# Patient Record
Sex: FEMALE | Race: WHITE | NOT HISPANIC OR LATINO | Employment: OTHER | URBAN - METROPOLITAN AREA
[De-identification: names, ages, dates, MRNs, and addresses within clinical notes are randomized per-mention and may not be internally consistent; named-entity substitution may affect disease eponyms.]

---

## 2017-01-14 ENCOUNTER — TELEPHONE (OUTPATIENT)
Dept: FAMILY MEDICINE | Facility: CLINIC | Age: 82
End: 2017-01-14

## 2017-01-14 ENCOUNTER — LAB VISIT (OUTPATIENT)
Dept: LAB | Facility: HOSPITAL | Age: 82
End: 2017-01-14
Attending: FAMILY MEDICINE
Payer: MEDICARE

## 2017-01-14 DIAGNOSIS — R30.0 DYSURIA: Primary | ICD-10-CM

## 2017-01-14 DIAGNOSIS — N39.0 LOWER URINARY TRACT INFECTIOUS DISEASE: ICD-10-CM

## 2017-01-14 DIAGNOSIS — R30.0 DYSURIA: ICD-10-CM

## 2017-01-14 LAB
BACTERIA #/AREA URNS AUTO: ABNORMAL /HPF
BILIRUB UR QL STRIP: NEGATIVE
CLARITY UR REFRACT.AUTO: ABNORMAL
COLOR UR AUTO: ABNORMAL
GLUCOSE UR QL STRIP: NEGATIVE
HGB UR QL STRIP: ABNORMAL
KETONES UR QL STRIP: NEGATIVE
LEUKOCYTE ESTERASE UR QL STRIP: ABNORMAL
MICROSCOPIC COMMENT: ABNORMAL
NITRITE UR QL STRIP: NEGATIVE
PH UR STRIP: 7 [PH] (ref 5–8)
PROT UR QL STRIP: NEGATIVE
RBC #/AREA URNS AUTO: 1 /HPF (ref 0–4)
SP GR UR STRIP: 1 (ref 1–1.03)
SQUAMOUS #/AREA URNS AUTO: 5 /HPF
URN SPEC COLLECT METH UR: ABNORMAL
UROBILINOGEN UR STRIP-ACNC: NEGATIVE EU/DL
WBC #/AREA URNS AUTO: 47 /HPF (ref 0–5)
WBC CLUMPS UR QL AUTO: ABNORMAL

## 2017-01-14 PROCEDURE — 81001 URINALYSIS AUTO W/SCOPE: CPT

## 2017-01-14 PROCEDURE — 87086 URINE CULTURE/COLONY COUNT: CPT

## 2017-01-14 RX ORDER — PHENAZOPYRIDINE HYDROCHLORIDE 200 MG/1
TABLET, FILM COATED ORAL
Qty: 9 TABLET | Refills: 0 | Status: SHIPPED | OUTPATIENT
Start: 2017-01-14 | End: 2017-09-12 | Stop reason: SDUPTHER

## 2017-01-14 RX ORDER — CIPROFLOXACIN 250 MG/1
250 TABLET, FILM COATED ORAL 2 TIMES DAILY
Qty: 10 TABLET | Refills: 0 | Status: SHIPPED | OUTPATIENT
Start: 2017-01-14 | End: 2017-01-19

## 2017-01-14 NOTE — TELEPHONE ENCOUNTER
Here are orders for UA and culture for her to have this am.  I also sent in an antibiotic to start while awaiting the culture results, thanks

## 2017-01-14 NOTE — TELEPHONE ENCOUNTER
----- Message from Vani Sanchez sent at 1/14/2017  9:15 AM CST -----  Contact: [phone]/ self   Patient requesting orders for urine culture.    Patient believes she may have a UTI.    Please advise.

## 2017-01-16 ENCOUNTER — TELEPHONE (OUTPATIENT)
Dept: FAMILY MEDICINE | Facility: CLINIC | Age: 82
End: 2017-01-16

## 2017-01-16 DIAGNOSIS — I10 ESSENTIAL HYPERTENSION: ICD-10-CM

## 2017-01-16 LAB — BACTERIA UR CULT: NO GROWTH

## 2017-01-16 RX ORDER — AMLODIPINE BESYLATE 10 MG/1
TABLET ORAL
Qty: 90 TABLET | Refills: 0 | Status: SHIPPED | OUTPATIENT
Start: 2017-01-16 | End: 2017-03-31 | Stop reason: SDUPTHER

## 2017-01-17 ENCOUNTER — TELEPHONE (OUTPATIENT)
Dept: FAMILY MEDICINE | Facility: CLINIC | Age: 82
End: 2017-01-17

## 2017-01-17 NOTE — TELEPHONE ENCOUNTER
Returned patients call. She stated she went to  Saturday to [place]. She thought she had a UTI. She received a call today sating it was not a UTI. She is still having symptoms and was advised to follow up with her PCP. She is now scheduled

## 2017-01-17 NOTE — TELEPHONE ENCOUNTER
----- Message from Giana Agrawal sent at 1/17/2017 11:23 AM CST -----  Contact: [phone]/ self   Patient is having frequent urination with discomfort and burning. Urinalysis didn't show anything and she was advised to follow up with PCP. Patient would like to be seen sooner than the next available appointment. Please advise.

## 2017-01-18 DIAGNOSIS — I10 ESSENTIAL HYPERTENSION: ICD-10-CM

## 2017-01-18 RX ORDER — AMLODIPINE BESYLATE 10 MG/1
TABLET ORAL
Qty: 90 TABLET | Refills: 3 | Status: SHIPPED | OUTPATIENT
Start: 2017-01-18 | End: 2018-04-16 | Stop reason: SDUPTHER

## 2017-01-30 ENCOUNTER — TELEPHONE (OUTPATIENT)
Dept: FAMILY MEDICINE | Facility: CLINIC | Age: 82
End: 2017-01-30

## 2017-01-30 RX ORDER — AZITHROMYCIN 250 MG/1
TABLET, FILM COATED ORAL
Qty: 6 TABLET | Refills: 0 | Status: SHIPPED | OUTPATIENT
Start: 2017-01-30 | End: 2017-03-31

## 2017-01-30 NOTE — TELEPHONE ENCOUNTER
Returned patient's call, no answer and unable to leave a voice mail due to phone ringing continuously.

## 2017-01-30 NOTE — TELEPHONE ENCOUNTER
----- Message from Argentina Evans sent at 1/30/2017  7:44 AM CST -----  Contact: self/[phone]  Patient would like to have a kallie-william called in for a sore throat today.  Please advise

## 2017-02-21 ENCOUNTER — TELEPHONE (OUTPATIENT)
Dept: FAMILY MEDICINE | Facility: CLINIC | Age: 82
End: 2017-02-21

## 2017-02-21 NOTE — TELEPHONE ENCOUNTER
----- Message from Kiarra Agudelo sent at 2/21/2017  3:39 PM CST -----  Contact: self, [phone]  Patient requests to be seen tomorrow if possible, states she is having blood pressure issues. Please advise.

## 2017-03-15 ENCOUNTER — TELEPHONE (OUTPATIENT)
Dept: PODIATRY | Facility: CLINIC | Age: 82
End: 2017-03-15

## 2017-03-15 DIAGNOSIS — I10 ESSENTIAL HYPERTENSION: ICD-10-CM

## 2017-03-15 RX ORDER — BENAZEPRIL HYDROCHLORIDE 40 MG/1
TABLET ORAL
Qty: 90 TABLET | Refills: 0 | Status: SHIPPED | OUTPATIENT
Start: 2017-03-15 | End: 2017-03-31 | Stop reason: SDUPTHER

## 2017-03-15 NOTE — TELEPHONE ENCOUNTER
----- Message from Nadia Urias MA sent at 3/15/2017 10:41 AM CDT -----  Contact: [phone] SELF  Waiting for a phone call to re schedule her appt that she miss today. Please advsie

## 2017-03-15 NOTE — TELEPHONE ENCOUNTER
----- Message from Nadia Urias MA sent at 3/15/2017 10:34 AM CDT -----  Contact: [phone] self  REFILLS:     Patient is requesting a medication refill.     RX name: benazepril (LOTENSIN)     Strength: 40mg tab       [address]     Pharmacy phone: [phone]    Contact phone number: [phone]

## 2017-03-22 DIAGNOSIS — F51.04 CHRONIC INSOMNIA: ICD-10-CM

## 2017-03-22 RX ORDER — ZOLPIDEM TARTRATE 10 MG/1
TABLET ORAL
Qty: 90 TABLET | Refills: 0 | Status: SHIPPED | OUTPATIENT
Start: 2017-03-22 | End: 2017-03-22 | Stop reason: SDUPTHER

## 2017-03-22 RX ORDER — ZOLPIDEM TARTRATE 10 MG/1
TABLET ORAL
Qty: 90 TABLET | Refills: 0 | Status: SHIPPED | OUTPATIENT
Start: 2017-03-22 | End: 2017-07-09 | Stop reason: SDUPTHER

## 2017-03-22 NOTE — TELEPHONE ENCOUNTER
----- Message from Teena Aldridge sent at 3/22/2017 12:39 PM CDT -----  Contact: [phone]/ self   Pt called stating pharmacy did not get rx  zolpidem (AMBIEN) 10 mg Tab. Please advise

## 2017-03-27 PROBLEM — R73.02 GLUCOSE INTOLERANCE (IMPAIRED GLUCOSE TOLERANCE): Status: ACTIVE | Noted: 2017-03-27

## 2017-03-28 ENCOUNTER — LAB VISIT (OUTPATIENT)
Dept: LAB | Facility: HOSPITAL | Age: 82
End: 2017-03-28
Attending: FAMILY MEDICINE
Payer: MEDICARE

## 2017-03-28 DIAGNOSIS — Z00.00 ROUTINE GENERAL MEDICAL EXAMINATION AT A HEALTH CARE FACILITY: ICD-10-CM

## 2017-03-28 DIAGNOSIS — F41.9 ANXIETY: ICD-10-CM

## 2017-03-28 DIAGNOSIS — I10 ESSENTIAL HYPERTENSION: ICD-10-CM

## 2017-03-28 DIAGNOSIS — R73.02 GLUCOSE INTOLERANCE (IMPAIRED GLUCOSE TOLERANCE): ICD-10-CM

## 2017-03-28 DIAGNOSIS — E03.4 HYPOTHYROIDISM DUE TO ACQUIRED ATROPHY OF THYROID: ICD-10-CM

## 2017-03-28 LAB
ALBUMIN SERPL BCP-MCNC: 3.7 G/DL
ALP SERPL-CCNC: 57 U/L
ALT SERPL W/O P-5'-P-CCNC: 14 U/L
ANION GAP SERPL CALC-SCNC: 8 MMOL/L
AST SERPL-CCNC: 17 U/L
BASOPHILS # BLD AUTO: 0.06 K/UL
BASOPHILS NFR BLD: 1 %
BILIRUB SERPL-MCNC: 0.6 MG/DL
BUN SERPL-MCNC: 10 MG/DL
CALCIUM SERPL-MCNC: 9.1 MG/DL
CHLORIDE SERPL-SCNC: 107 MMOL/L
CHOLEST/HDLC SERPL: 3.2 {RATIO}
CO2 SERPL-SCNC: 27 MMOL/L
CREAT SERPL-MCNC: 0.8 MG/DL
DIFFERENTIAL METHOD: NORMAL
EOSINOPHIL # BLD AUTO: 0.5 K/UL
EOSINOPHIL NFR BLD: 7.4 %
ERYTHROCYTE [DISTWIDTH] IN BLOOD BY AUTOMATED COUNT: 12.8 %
EST. GFR  (AFRICAN AMERICAN): >60 ML/MIN/1.73 M^2
EST. GFR  (NON AFRICAN AMERICAN): >60 ML/MIN/1.73 M^2
GLUCOSE SERPL-MCNC: 113 MG/DL
HCT VFR BLD AUTO: 43.3 %
HDL/CHOLESTEROL RATIO: 31.7 %
HDLC SERPL-MCNC: 224 MG/DL
HDLC SERPL-MCNC: 71 MG/DL
HGB BLD-MCNC: 14.4 G/DL
LDLC SERPL CALC-MCNC: 133.6 MG/DL
LYMPHOCYTES # BLD AUTO: 2.4 K/UL
LYMPHOCYTES NFR BLD: 38.5 %
MCH RBC QN AUTO: 30.9 PG
MCHC RBC AUTO-ENTMCNC: 33.3 %
MCV RBC AUTO: 93 FL
MONOCYTES # BLD AUTO: 0.6 K/UL
MONOCYTES NFR BLD: 10.1 %
NEUTROPHILS # BLD AUTO: 2.6 K/UL
NEUTROPHILS NFR BLD: 42.8 %
NONHDLC SERPL-MCNC: 153 MG/DL
PLATELET # BLD AUTO: 277 K/UL
PMV BLD AUTO: 10.2 FL
POTASSIUM SERPL-SCNC: 4.1 MMOL/L
PROT SERPL-MCNC: 7.2 G/DL
RBC # BLD AUTO: 4.66 M/UL
SODIUM SERPL-SCNC: 142 MMOL/L
THYROPEROXIDASE IGG SERPL-ACNC: 212.6 IU/ML
TRIGL SERPL-MCNC: 97 MG/DL
TSH SERPL DL<=0.005 MIU/L-ACNC: 1.97 UIU/ML
WBC # BLD AUTO: 6.11 K/UL

## 2017-03-28 PROCEDURE — 84443 ASSAY THYROID STIM HORMONE: CPT

## 2017-03-28 PROCEDURE — 86376 MICROSOMAL ANTIBODY EACH: CPT

## 2017-03-28 PROCEDURE — 83036 HEMOGLOBIN GLYCOSYLATED A1C: CPT

## 2017-03-28 PROCEDURE — 36415 COLL VENOUS BLD VENIPUNCTURE: CPT | Mod: PO

## 2017-03-28 PROCEDURE — 80053 COMPREHEN METABOLIC PANEL: CPT

## 2017-03-28 PROCEDURE — 85025 COMPLETE CBC W/AUTO DIFF WBC: CPT

## 2017-03-28 PROCEDURE — 80061 LIPID PANEL: CPT

## 2017-03-28 RX ORDER — LORAZEPAM 1 MG/1
TABLET ORAL
Qty: 90 TABLET | Refills: 0 | Status: SHIPPED | OUTPATIENT
Start: 2017-03-28 | End: 2017-05-25 | Stop reason: SDUPTHER

## 2017-03-29 LAB
ESTIMATED AVG GLUCOSE: 128 MG/DL
HBA1C MFR BLD HPLC: 6.1 %

## 2017-03-31 ENCOUNTER — OFFICE VISIT (OUTPATIENT)
Dept: FAMILY MEDICINE | Facility: CLINIC | Age: 82
End: 2017-03-31
Payer: MEDICARE

## 2017-03-31 VITALS
HEART RATE: 65 BPM | HEIGHT: 66 IN | DIASTOLIC BLOOD PRESSURE: 72 MMHG | OXYGEN SATURATION: 95 % | WEIGHT: 139.31 LBS | SYSTOLIC BLOOD PRESSURE: 128 MMHG | BODY MASS INDEX: 22.39 KG/M2

## 2017-03-31 DIAGNOSIS — E03.4 HYPOTHYROIDISM DUE TO ACQUIRED ATROPHY OF THYROID: ICD-10-CM

## 2017-03-31 DIAGNOSIS — R73.02 GLUCOSE INTOLERANCE (IMPAIRED GLUCOSE TOLERANCE): ICD-10-CM

## 2017-03-31 DIAGNOSIS — F41.9 ANXIETY: ICD-10-CM

## 2017-03-31 DIAGNOSIS — Z00.00 ROUTINE GENERAL MEDICAL EXAMINATION AT A HEALTH CARE FACILITY: Primary | ICD-10-CM

## 2017-03-31 DIAGNOSIS — C50.912 MALIGNANT NEOPLASM OF LEFT FEMALE BREAST, UNSPECIFIED SITE OF BREAST: ICD-10-CM

## 2017-03-31 DIAGNOSIS — I10 ESSENTIAL HYPERTENSION: ICD-10-CM

## 2017-03-31 PROCEDURE — 99213 OFFICE O/P EST LOW 20 MIN: CPT | Mod: PBBFAC,PO | Performed by: FAMILY MEDICINE

## 2017-03-31 PROCEDURE — 99999 PR PBB SHADOW E&M-EST. PATIENT-LVL III: CPT | Mod: PBBFAC,,, | Performed by: FAMILY MEDICINE

## 2017-03-31 PROCEDURE — 99397 PER PM REEVAL EST PAT 65+ YR: CPT | Mod: S$PBB,,, | Performed by: FAMILY MEDICINE

## 2017-03-31 RX ORDER — AZITHROMYCIN 250 MG/1
TABLET, FILM COATED ORAL
Qty: 6 TABLET | Refills: 0 | Status: SHIPPED | OUTPATIENT
Start: 2017-03-31 | End: 2017-06-02

## 2017-03-31 RX ORDER — BENAZEPRIL HYDROCHLORIDE 40 MG/1
40 TABLET ORAL DAILY
Qty: 90 TABLET | Refills: 3 | Status: SHIPPED | OUTPATIENT
Start: 2017-03-31 | End: 2018-05-29 | Stop reason: SDUPTHER

## 2017-03-31 NOTE — PROGRESS NOTES
Subjective:       Patient ID: Isabelle Roberts is a 83 y.o. female.    Chief Complaint: Follow-up    HPI Comments: 83 yr old pleasant white female with anxiety, insomnia, HTN, hypothyroidism, breast CA in remission, glaucoma, presents today for her annual wellness exam, lab work review. She also wants med refill.    HTN - controlled- on norvasc and lisinopril daily - compliant - no side effects      Hypothyroidism - controlled - normal TSh - on synthroid - no side effects      Anxiety/insomnia - chronic and stable - takes ativan once a day and ambien at night and no side effects      Breast CA in remission - follows oncology      History as below - reviewed      Health maintenance  -labs UTD  -mammo UTD  -vaccines UTD        Medication Refill   This is a chronic problem. The current episode started more than 1 year ago. The problem occurs constantly. The problem has been gradually improving. Pertinent negatives include no arthralgias, chest pain, congestion, coughing, diaphoresis, headaches, joint swelling, myalgias, nausea, neck pain, rash, sore throat, visual change, vomiting or weakness. Nothing aggravates the symptoms. Treatments tried: as below. The treatment provided significant relief.   Hypertension   This is a chronic problem. The current episode started more than 1 year ago. The problem has been gradually improving since onset. The problem is controlled. Associated symptoms include anxiety. Pertinent negatives include no chest pain, headaches, malaise/fatigue, neck pain, palpitations, PND, shortness of breath or sweats. There are no associated agents to hypertension. Risk factors for coronary artery disease include post-menopausal state. Past treatments include calcium channel blockers and ACE inhibitors. The current treatment provides significant improvement. There are no compliance problems.  Hypertensive end-organ damage includes a thyroid problem. There is no history of angina, CAD/MI, CVA, left  ventricular hypertrophy, PVD or retinopathy. There is no history of chronic renal disease, coarctation of the aorta, a hypertension causing med or sleep apnea.   Thyroid Problem   Presents for follow-up visit. Symptoms include anxiety. Patient reports no cold intolerance, constipation, diaphoresis, diarrhea, heat intolerance, menstrual problem, palpitations, tremors, visual change or weight loss. The symptoms have been stable. Past treatments include levothyroxine. The treatment provided significant relief. Prior procedures include thyroid ultrasound. There is no history of atrial fibrillation, diabetes, Graves' ophthalmopathy, hyperlipidemia, neuropathy or osteopenia. There are no known risk factors.   Anxiety   Presents for follow-up visit. Symptoms include excessive worry and nervous/anxious behavior. Patient reports no chest pain, confusion, decreased concentration, dizziness, nausea, palpitations, shortness of breath or suicidal ideas. Symptoms occur most days. Nothing aggravates the symptoms. The quality of sleep is fair. Nighttime awakenings: occasional.     There are no known risk factors. Her past medical history is significant for anxiety/panic attacks. There is no history of asthma, bipolar disorder, CAD, chronic lung disease, depression, fibromyalgia or suicide attempts. Past treatments include benzodiazephines. The treatment provided significant relief. Compliance with prior treatments has been good. Compliance with medications is %.     Review of Systems   Constitutional: Negative.  Negative for activity change, diaphoresis, malaise/fatigue, unexpected weight change and weight loss.   HENT: Negative.  Negative for congestion, ear discharge, hearing loss, rhinorrhea, sore throat and voice change.    Eyes: Negative.  Negative for pain, discharge and visual disturbance.   Respiratory: Negative.  Negative for cough, chest tightness, shortness of breath and wheezing.    Cardiovascular: Negative.   Negative for chest pain, palpitations and PND.   Gastrointestinal: Negative.  Negative for abdominal distention, anal bleeding, constipation, diarrhea, nausea and vomiting.   Endocrine: Negative.  Negative for cold intolerance, heat intolerance, polydipsia and polyuria.   Genitourinary: Negative.  Negative for decreased urine volume, difficulty urinating, dysuria, frequency, menstrual problem and vaginal pain.   Musculoskeletal: Negative.  Negative for arthralgias, gait problem, joint swelling, myalgias and neck pain.   Skin: Negative.  Negative for color change, pallor, rash and wound.   Allergic/Immunologic: Negative.  Negative for environmental allergies and immunocompromised state.   Neurological: Negative.  Negative for dizziness, tremors, seizures, speech difficulty, weakness and headaches.   Hematological: Negative.  Negative for adenopathy. Does not bruise/bleed easily.   Psychiatric/Behavioral: Negative for agitation, confusion, decreased concentration, hallucinations, self-injury and suicidal ideas. The patient is nervous/anxious.        PMH/PSH/FH/SH/MED/ALLERGY reviewed    Objective:       Vitals:    03/31/17 1021   BP: 128/72   Pulse: 65       Physical Exam   Constitutional: She is oriented to person, place, and time. She appears well-developed and well-nourished. No distress.   HENT:   Head: Normocephalic and atraumatic.   Right Ear: External ear normal.   Left Ear: External ear normal.   Nose: Nose normal.   Mouth/Throat: Oropharynx is clear and moist. No oropharyngeal exudate.   Eyes: Conjunctivae and EOM are normal. Pupils are equal, round, and reactive to light. Right eye exhibits no discharge. Left eye exhibits no discharge. No scleral icterus.   Neck: Normal range of motion. Neck supple. No JVD present. No tracheal deviation present. No thyromegaly present.   Cardiovascular: Normal rate, regular rhythm, normal heart sounds and intact distal pulses.  Exam reveals no gallop and no friction rub.     No murmur heard.  Pulmonary/Chest: Effort normal and breath sounds normal. No stridor. She has no wheezes. She has no rales. She exhibits no tenderness.   Abdominal: Soft. Bowel sounds are normal. She exhibits no distension and no mass. There is no tenderness. There is no rebound and no guarding. No hernia.   Musculoskeletal: Normal range of motion. She exhibits no edema or tenderness.   Lymphadenopathy:     She has no cervical adenopathy.   Neurological: She is alert and oriented to person, place, and time. She has normal reflexes. She displays normal reflexes. No cranial nerve deficit. She exhibits normal muscle tone. Coordination normal.   Skin: Skin is warm and dry. No rash noted. She is not diaphoretic. No erythema. No pallor.   Psychiatric: She has a normal mood and affect. Her behavior is normal. Judgment and thought content normal.       Assessment:       1. Routine general medical examination at a health care facility    2. Malignant neoplasm of left female breast, unspecified site of breast    3. Anxiety    4. Essential hypertension    5. Hypothyroidism due to acquired atrophy of thyroid    6. Glucose intolerance (impaired glucose tolerance)        Plan:       Isabelle was seen today for follow-up.    Diagnoses and all orders for this visit:    Routine general medical examination at a health care facility    Malignant neoplasm of left female breast, unspecified site of breast    Anxiety    Essential hypertension  -     benazepril (LOTENSIN) 40 MG tablet; Take 1 tablet (40 mg total) by mouth once daily.    Hypothyroidism due to acquired atrophy of thyroid    Glucose intolerance (impaired glucose tolerance)    Other orders  -     azithromycin (Z-GERRY) 250 MG tablet; TAKE TWO TABLETS BY MOUTH ON DAY 1, AND THEN TAKE ONE TABLET BY MOUTH ONCE A DAY FOR DAYS 2-5      Wellness check  -normal exam  -labs reassuring      HTN  -controlled  -refilled meds  -labs    Hypothyroidism  -controlled      Chronic  insomnia  Ambien 10 prn  -sleep hygiene    Left breast CA  -in remission  -follows oncology    Anxiety  -controlled  -continue REMERON  -ativan prn. Side effects of medications have been discussed and patient agreed to proceed with treatment and understands the risks and benefits.    Spent adequate time in obtaining history and explaining differentials    40 minutes spent during this visit of which greater than 50% devoted to face-face counseling and coordination of care regarding diagnosis and management plan    Return in about 1 year (around 3/31/2018), or if symptoms worsen or fail to improve.

## 2017-03-31 NOTE — MR AVS SNAPSHOT
[address]  [place] [address]  Phone: [phone]  Fax: [phone]                  Isabelle Roberts   3/31/2017 10:00 AM   Office Visit    Description:  Female : 12/10/1933   Provider:  Devin Bernal MD   Department:  [facility]           Reason for Visit     Follow-up           Diagnoses this Visit        Comments    Routine general medical examination at a health care facility    -  Primary     Malignant neoplasm of left female breast, unspecified site of breast         Anxiety         Essential hypertension         Hypothyroidism due to acquired atrophy of thyroid         Glucose intolerance (impaired glucose tolerance)                To Do List           Goals (5 Years of Data)     None      Follow-Up and Disposition     Return in about 1 year (around 3/31/2018), or if symptoms worsen or fail to improve.       These Medications        Disp Refills Start End    benazepril (LOTENSIN) 40 MG tablet 90 tablet 3 3/31/2017     Take 1 tablet (40 mg total) by mouth once daily. - Oral    Pharmacy: [address] ([facility] & [address] Ph #: [phone]       azithromycin (Z-GERRY) 250 MG tablet 6 tablet 0 3/31/2017     TAKE TWO TABLETS BY MOUTH ON DAY 1, AND THEN TAKE ONE TABLET BY MOUTH ONCE A DAY FOR DAYS 2-5    [facility] [address] [facility] ([facility] & [place], [place] - [address] [facility] Ph #: [phone]         [facility] On [facility]     [facility] Nurse Care Line -  Assistance  Unless otherwise directed by your provider, please contact [facility], our nurse care line that is available for / assistance.     Registered nurses in the [facility] provide: appointment scheduling, clinical advisement, health education, and other advisory services.  Call: [phone] (toll free)               Medications           Message regarding Medications     Verify the changes  and/or additions to your medication regime listed below are the same as discussed with your clinician today.  If any of these changes or additions are incorrect, please notify your healthcare provider.        CHANGE how you are taking these medications     Start Taking Instead of    benazepril (LOTENSIN) 40 MG tablet benazepril (LOTENSIN) 40 MG tablet    Dosage:  Take 1 tablet (40 mg total) by mouth once daily. Dosage:  TAKE ONE TABLET BY MOUTH ONCE DAILY    Reason for Change:  Reorder       STOP taking these medications     erythromycin (ROMYCIN) ophthalmic ointment Place into the right eye as needed.            Verify that the below list of medications is an accurate representation of the medications you are currently taking.  If none reported, the list may be blank. If incorrect, please contact your healthcare provider. Carry this list with you in case of emergency.           Current Medications     amlodipine (NORVASC) 10 MG tablet TAKE ONE TABLET (10 MG TOTAL) BY MOUTH ONCE DAILY    benazepril (LOTENSIN) 40 MG tablet Take 1 tablet (40 mg total) by mouth once daily.    conjugated estrogens (PREMARIN) vaginal cream Place vaginally once daily.    dorzolamide-timolol 2-0.5% (COSOPT) 22.3-6.8 mg/mL ophthalmic solution Place 1 drop into both eyes 2 (two) times daily.     lorazepam (ATIVAN) 1 MG tablet TAKE ONE TABLET BY MOUTH EVERY EVENING    LUMIGAN 0.01 % Drop Place 1 drop into the right eye every evening.     mirtazapine (REMERON) 15 MG tablet Take 1 tablet (15 mg total) by mouth nightly.    phenazopyridine (PYRIDIUM) 200 MG tablet TAKE ONE TABLET BY MOUTH THREE TIMES DAILY AS NEEDED FOR PAIN    SYNTHROID 75 mcg tablet Take 1 tablet (75 mcg total) by mouth before breakfast.    tretinoin (RETIN-A) 0.05 % cream APPLY on faceTOPICALLY AT BEDTIME till acne clears    zolpidem (AMBIEN) 10 mg Tab TAKE ONE TABLET BY MOUTH IN THE EVENING AS NEEDED .    azithromycin (Z-GERRY) 250 MG tablet TAKE TWO TABLETS BY MOUTH ON DAY 1,  "AND THEN TAKE ONE TABLET BY MOUTH ONCE A DAY FOR DAYS 2-5           Clinical Reference Information           Your Vitals Were     BP Pulse Height Weight SpO2 BMI    128/72 65 5' 5.5" (1.664 m) 63.2 kg (139 lb 5.3 oz) 93% 22.83 kg/m2      Blood Pressure          Most Recent Value    BP  128/72      Allergies as of 3/31/2017     Sunscreen    Sulfa (Sulfonamide Antibiotics)      Immunizations Administered on Date of Encounter - 3/31/2017     None      [facility] Sign-Up     Activating your [facility] account is as easy as 1-2-3!     1) Visit [email or web address], select Sign Up Now, enter this activation code and your date of birth, then select Next.  [email or web address]  Expires: 5/15/2017 10:28 AM      2) Create a username and password to use when you visit [facility] in the future and select a security question in case you lose your password and select Next.    3) Enter your e-mail address and click Sign Up!    Additional Information  If you have questions, please e-mail [email or web address] or call [phone] to talk to our [facility] staff. Remember, [facility] is NOT to be used for urgent needs. For medical emergencies, dial 911.         Language Assistance Services     ATTENTION: Language assistance services are available, free of charge. Please call [phone].      ATENCIÓN: Si habla español, tiene a evans disposición servicios gratuitos de asistencia lingüística. Llame al [phone].     CHÚ Ý: N?u b?n nói Ti?ng Vi?t, có các d?ch v? h? tr? ngôn ng? mi?n phí dành cho b?n. G?i s? [phone].         [facility] complies with applicable Federal civil rights laws and does not discriminate on the basis of race, color, national origin, age, disability, or sex.        "

## 2017-04-04 ENCOUNTER — TELEPHONE (OUTPATIENT)
Dept: FAMILY MEDICINE | Facility: CLINIC | Age: 82
End: 2017-04-04

## 2017-04-04 NOTE — TELEPHONE ENCOUNTER
----- Message from Dk London sent at 4/4/2017 11:15 AM CDT -----  Contact: self/[phone]  Patient would like to have test results mailed to her at her home.  Please advise.

## 2017-05-25 DIAGNOSIS — F41.9 ANXIETY: ICD-10-CM

## 2017-05-25 RX ORDER — LORAZEPAM 1 MG/1
TABLET ORAL
Qty: 90 TABLET | Refills: 0 | Status: SHIPPED | OUTPATIENT
Start: 2017-05-25 | End: 2017-10-09 | Stop reason: SDUPTHER

## 2017-06-02 ENCOUNTER — OFFICE VISIT (OUTPATIENT)
Dept: FAMILY MEDICINE | Facility: CLINIC | Age: 82
End: 2017-06-02
Payer: MEDICARE

## 2017-06-02 VITALS
TEMPERATURE: 98 F | DIASTOLIC BLOOD PRESSURE: 68 MMHG | WEIGHT: 138.69 LBS | SYSTOLIC BLOOD PRESSURE: 132 MMHG | HEIGHT: 66 IN | HEART RATE: 77 BPM | OXYGEN SATURATION: 97 % | BODY MASS INDEX: 22.29 KG/M2

## 2017-06-02 DIAGNOSIS — R09.81 SINUS CONGESTION: ICD-10-CM

## 2017-06-02 DIAGNOSIS — J06.9 UPPER RESPIRATORY TRACT INFECTION, UNSPECIFIED TYPE: Primary | ICD-10-CM

## 2017-06-02 PROCEDURE — 99999 PR PBB SHADOW E&M-EST. PATIENT-LVL III: CPT | Mod: PBBFAC,,, | Performed by: FAMILY MEDICINE

## 2017-06-02 PROCEDURE — 99214 OFFICE O/P EST MOD 30 MIN: CPT | Mod: S$PBB,,, | Performed by: FAMILY MEDICINE

## 2017-06-02 PROCEDURE — 99213 OFFICE O/P EST LOW 20 MIN: CPT | Mod: PBBFAC,PO | Performed by: FAMILY MEDICINE

## 2017-06-02 RX ORDER — PROMETHAZINE HYDROCHLORIDE AND DEXTROMETHORPHAN HYDROBROMIDE 6.25; 15 MG/5ML; MG/5ML
5 SYRUP ORAL 3 TIMES DAILY PRN
Qty: 180 ML | Refills: 0 | Status: SHIPPED | OUTPATIENT
Start: 2017-06-02 | End: 2017-06-12

## 2017-06-02 RX ORDER — AZITHROMYCIN 500 MG/1
500 TABLET, FILM COATED ORAL DAILY
Qty: 3 TABLET | Refills: 0 | Status: SHIPPED | OUTPATIENT
Start: 2017-06-02 | End: 2017-06-05

## 2017-06-02 RX ORDER — LATANOPROST 50 UG/ML
SOLUTION/ DROPS OPHTHALMIC
COMMUNITY
Start: 2017-03-24 | End: 2020-02-05

## 2017-06-02 RX ORDER — GUAIFENESIN 600 MG/1
600 TABLET, EXTENDED RELEASE ORAL 2 TIMES DAILY
Qty: 20 TABLET | Refills: 0 | Status: SHIPPED | OUTPATIENT
Start: 2017-06-02 | End: 2017-06-12

## 2017-06-02 NOTE — PROGRESS NOTES
Subjective:       Patient ID: Isabelle Roberts is a 83 y.o. female.    Chief Complaint: Sinus Problem; Fatigue; and Fever    83 years old female came to the clinic with cough and congestion for the last week associated with sinus pressure and general malaise.  Patient with fever one time currently resolved.  No sick contacts at home.      Sinus Problem   Associated symptoms include congestion, coughing, sinus pressure, sneezing and a sore throat. Pertinent negatives include no shortness of breath.   Fatigue   Associated symptoms include congestion, coughing, fatigue, a fever and a sore throat.   Fever    Associated symptoms include congestion, coughing and a sore throat. Pertinent negatives include no wheezing.     Review of Systems   Constitutional: Positive for fatigue and fever.   HENT: Positive for congestion, sinus pressure, sneezing and sore throat.    Eyes: Negative.    Respiratory: Positive for cough. Negative for apnea, choking, chest tightness, shortness of breath, wheezing and stridor.    Gastrointestinal: Negative.    Genitourinary: Negative.    Musculoskeletal: Negative.    Neurological: Negative.    Psychiatric/Behavioral: Negative.        Objective:      Physical Exam   Constitutional: She is oriented to person, place, and time. She appears well-developed and well-nourished. No distress.   HENT:   Head: Normocephalic and atraumatic.   Right Ear: External ear normal.   Left Ear: External ear normal.   Nose: Rhinorrhea present.   Mouth/Throat: Posterior oropharyngeal erythema present. No oropharyngeal exudate.   Eyes: Conjunctivae and EOM are normal. Pupils are equal, round, and reactive to light. Right eye exhibits no discharge. Left eye exhibits no discharge. No scleral icterus.   Neck: Normal range of motion. Neck supple. No JVD present. No tracheal deviation present. No thyromegaly present.   Cardiovascular: Normal rate, regular rhythm, normal heart sounds and intact distal pulses.  Exam reveals no  gallop and no friction rub.    No murmur heard.  Pulmonary/Chest: Effort normal and breath sounds normal. No stridor. No respiratory distress. She has no wheezes. She has no rales. She exhibits no tenderness.   Abdominal: Soft. Bowel sounds are normal. She exhibits no distension and no mass. There is no tenderness. There is no rebound and no guarding.   Musculoskeletal: Normal range of motion. She exhibits no edema or tenderness.   Lymphadenopathy:     She has no cervical adenopathy.   Neurological: She is alert and oriented to person, place, and time. She has normal reflexes. No cranial nerve deficit. She exhibits normal muscle tone. Coordination normal.   Skin: Skin is warm and dry. No rash noted. She is not diaphoretic. No erythema. No pallor.   Psychiatric: She has a normal mood and affect. Her behavior is normal. Judgment and thought content normal.       Assessment:       1. Upper respiratory tract infection, unspecified type    2. Sinus congestion        Plan:         Isabelle was seen today for sinus problem, fatigue and fever.    Diagnoses and all orders for this visit:    Upper respiratory tract infection, unspecified type    Sinus congestion    Other orders  -     promethazine-dextromethorphan (PROMETHAZINE-DM) 6.25-15 mg/5 mL Syrp; Take 5 mLs by mouth 3 (three) times daily as needed.  -     guaifenesin (MUCINEX) 600 mg 12 hr tablet; Take 1 tablet (600 mg total) by mouth 2 (two) times daily.  -     azithromycin (ZITHROMAX) 500 MG tablet; Take 1 tablet (500 mg total) by mouth once daily.     if not improvement in 3 days patient can start antibiotics.

## 2017-06-07 ENCOUNTER — TELEPHONE (OUTPATIENT)
Dept: FAMILY MEDICINE | Facility: CLINIC | Age: 82
End: 2017-06-07

## 2017-06-07 RX ORDER — FLUCONAZOLE 150 MG/1
150 TABLET ORAL DAILY
Qty: 1 TABLET | Refills: 0 | Status: SHIPPED | OUTPATIENT
Start: 2017-06-07 | End: 2017-06-08

## 2017-06-07 NOTE — TELEPHONE ENCOUNTER
----- Message from Argentina Evans sent at 6/7/2017 11:11 AM CDT -----  Contact: self/[phone]  Patient would like some Diflucan called in for a yeast infection.  She took some antibiotics and now has a yeast infection and she is requesting about 3 pills.  Please advise

## 2017-06-07 NOTE — TELEPHONE ENCOUNTER
Pt states that she has a yeast infection from taking antibiotics and requesting Diflucan. Pls advise.

## 2017-07-09 DIAGNOSIS — F51.04 CHRONIC INSOMNIA: ICD-10-CM

## 2017-07-10 RX ORDER — ZOLPIDEM TARTRATE 10 MG/1
TABLET ORAL
Qty: 90 TABLET | Refills: 0 | Status: SHIPPED | OUTPATIENT
Start: 2017-07-10 | End: 2017-09-30 | Stop reason: SDUPTHER

## 2017-07-18 ENCOUNTER — TELEPHONE (OUTPATIENT)
Dept: FAMILY MEDICINE | Facility: CLINIC | Age: 82
End: 2017-07-18

## 2017-07-18 NOTE — TELEPHONE ENCOUNTER
Spoke to pt and states that she is having leg pain with swelling. Pt was offered an urgent care appt for today with Dr. Box, but pt refused. Pt was offered [facility] urgent care facility, but pt refused, also. Pt stated that she will call back on tomorrow for an urgent care at [place] or [place]. Stated that she can go no further.

## 2017-07-18 NOTE — TELEPHONE ENCOUNTER
----- Message from Luis Robb sent at 7/18/2017  9:23 AM CDT -----  Contact: [phone]/self  Pt requesting to be seen today by Dr. Bernal only for leg pain.  Please advise

## 2017-08-29 ENCOUNTER — TELEPHONE (OUTPATIENT)
Dept: OPHTHALMOLOGY | Facility: CLINIC | Age: 82
End: 2017-08-29

## 2017-08-29 NOTE — TELEPHONE ENCOUNTER
----- Message from Alicia Vasquez sent at 8/29/2017  2:32 PM CDT -----  Contact: Isablele Rosario  Ms. Roberts would like to schedule an appointment to have an eyelid procedure. She can be reached at [phone]

## 2017-09-08 ENCOUNTER — TELEPHONE (OUTPATIENT)
Dept: FAMILY MEDICINE | Facility: CLINIC | Age: 82
End: 2017-09-08

## 2017-09-08 RX ORDER — CIPROFLOXACIN 500 MG/1
500 TABLET ORAL 2 TIMES DAILY
Qty: 14 TABLET | Refills: 0 | Status: SHIPPED | OUTPATIENT
Start: 2017-09-08 | End: 2017-09-15

## 2017-09-08 NOTE — TELEPHONE ENCOUNTER
----- Message from Kiarra Agudelo sent at 9/8/2017 12:17 PM CDT -----  Contact: self, [phone]  Patient requests to be seen today or bring in a urine sample to have medication prescribed for UTI. Please advise.

## 2017-09-08 NOTE — TELEPHONE ENCOUNTER
Started today she thinks bladder infection, painful urination. She is taking Azo. She wants Antibiotic called in.

## 2017-09-12 ENCOUNTER — TELEPHONE (OUTPATIENT)
Dept: HEMATOLOGY/ONCOLOGY | Facility: CLINIC | Age: 82
End: 2017-09-12

## 2017-09-12 DIAGNOSIS — N39.0 LOWER URINARY TRACT INFECTIOUS DISEASE: ICD-10-CM

## 2017-09-12 RX ORDER — PHENAZOPYRIDINE HYDROCHLORIDE 200 MG/1
200 TABLET, FILM COATED ORAL 3 TIMES DAILY PRN
Qty: 9 TABLET | Refills: 0 | Status: SHIPPED | OUTPATIENT
Start: 2017-09-12 | End: 2018-06-20

## 2017-09-12 NOTE — TELEPHONE ENCOUNTER
----- Message from Ailyn Barr sent at 9/12/2017 12:45 PM CDT -----  Contact: PT  PT received a letter in the mail stating she is due for a mammogram, however there is no orders placed yet for her to get that done.    Callback: [phone]

## 2017-09-13 ENCOUNTER — TELEPHONE (OUTPATIENT)
Dept: HEMATOLOGY/ONCOLOGY | Facility: CLINIC | Age: 82
End: 2017-09-13

## 2017-09-13 DIAGNOSIS — C50.012 MALIGNANT NEOPLASM OF NIPPLE OF LEFT BREAST IN FEMALE, UNSPECIFIED ESTROGEN RECEPTOR STATUS: Primary | ICD-10-CM

## 2017-09-13 NOTE — TELEPHONE ENCOUNTER
----- Message from Reta Sims sent at 9/13/2017  9:30 AM CDT -----  Contact: Self  Pt is calling requesting orders for her annual mammogram.    He can be reached at [phone].    Thank you.

## 2017-09-13 NOTE — TELEPHONE ENCOUNTER
Made pt aware that message has been forwarded on to  for review. This nurse will notify her when orders are in so she can schedule. Pt verbalized understanding.       ----- Message from Reta Sims sent at 9/13/2017  9:30 AM CDT -----  Contact: Self  Pt is calling requesting orders for her annual mammogram.    He can be reached at [phone].    Thank you.

## 2017-09-14 ENCOUNTER — TELEPHONE (OUTPATIENT)
Dept: FAMILY MEDICINE | Facility: CLINIC | Age: 82
End: 2017-09-14

## 2017-09-14 NOTE — TELEPHONE ENCOUNTER
----- Message from Janine Aguilar sent at 9/14/2017 11:53 AM CDT -----  No. [phone]   Patient needs a 6 month follow up appointment.  She would like to come in early October.    Please call.

## 2017-09-15 ENCOUNTER — TELEPHONE (OUTPATIENT)
Dept: FAMILY MEDICINE | Facility: CLINIC | Age: 82
End: 2017-09-15

## 2017-09-15 DIAGNOSIS — E03.4 HYPOTHYROIDISM DUE TO ACQUIRED ATROPHY OF THYROID: ICD-10-CM

## 2017-09-15 DIAGNOSIS — R73.02 GLUCOSE INTOLERANCE (IMPAIRED GLUCOSE TOLERANCE): ICD-10-CM

## 2017-09-15 DIAGNOSIS — I10 ESSENTIAL HYPERTENSION: Primary | ICD-10-CM

## 2017-09-15 NOTE — TELEPHONE ENCOUNTER
----- Message from Megan Abbott sent at 9/15/2017  1:15 PM CDT -----  Contact: Self/ [phone]  Patient would like blood work orders put in the system. Please advise.

## 2017-09-22 ENCOUNTER — TELEPHONE (OUTPATIENT)
Dept: FAMILY MEDICINE | Facility: CLINIC | Age: 82
End: 2017-09-22

## 2017-09-22 NOTE — TELEPHONE ENCOUNTER
----- Message from Luis Robb sent at 9/22/2017  1:28 PM CDT -----  Contact: [phone]/self  Pt would like to know if she can receive the flu shot when she come in for her appointment.  Appointment scheduled for 10/9/2017.   Please advise

## 2017-09-22 NOTE — TELEPHONE ENCOUNTER
Returned the patient's call to inform her that we don;t have the flu shots here in office just yet. I wasn't able to leave a message due to no voicemail being set up.

## 2017-09-30 DIAGNOSIS — F51.04 CHRONIC INSOMNIA: ICD-10-CM

## 2017-10-02 ENCOUNTER — HOSPITAL ENCOUNTER (OUTPATIENT)
Dept: RADIOLOGY | Facility: HOSPITAL | Age: 82
Discharge: HOME | End: 2017-10-02
Attending: INTERNAL MEDICINE
Payer: MEDICARE

## 2017-10-02 VITALS — BODY MASS INDEX: 22.99 KG/M2 | HEIGHT: 65 IN | WEIGHT: 138 LBS

## 2017-10-02 DIAGNOSIS — F51.04 CHRONIC INSOMNIA: ICD-10-CM

## 2017-10-02 DIAGNOSIS — C50.012 MALIGNANT NEOPLASM OF NIPPLE OF LEFT BREAST IN FEMALE, UNSPECIFIED ESTROGEN RECEPTOR STATUS: ICD-10-CM

## 2017-10-02 PROCEDURE — 77066 DX MAMMO INCL CAD BI: CPT | Mod: TC

## 2017-10-02 PROCEDURE — 77062 BREAST TOMOSYNTHESIS BI: CPT | Mod: 26,,, | Performed by: RADIOLOGY

## 2017-10-02 PROCEDURE — 77066 DX MAMMO INCL CAD BI: CPT | Mod: 26,,, | Performed by: RADIOLOGY

## 2017-10-02 RX ORDER — ZOLPIDEM TARTRATE 10 MG/1
TABLET ORAL
Qty: 90 TABLET | Refills: 1 | Status: SHIPPED | OUTPATIENT
Start: 2017-10-02 | End: 2017-10-02 | Stop reason: SDUPTHER

## 2017-10-02 RX ORDER — ZOLPIDEM TARTRATE 10 MG/1
TABLET ORAL
Qty: 90 TABLET | Refills: 0 | Status: SHIPPED | OUTPATIENT
Start: 2017-10-02 | End: 2017-10-09 | Stop reason: SDUPTHER

## 2017-10-02 RX ORDER — ZOLPIDEM TARTRATE 10 MG/1
TABLET ORAL
Qty: 90 TABLET | Refills: 0 | Status: SHIPPED | OUTPATIENT
Start: 2017-10-02 | End: 2017-10-02 | Stop reason: SDUPTHER

## 2017-10-04 ENCOUNTER — LAB VISIT (OUTPATIENT)
Dept: LAB | Facility: HOSPITAL | Age: 82
End: 2017-10-04
Attending: FAMILY MEDICINE
Payer: MEDICARE

## 2017-10-04 ENCOUNTER — TELEPHONE (OUTPATIENT)
Dept: FAMILY MEDICINE | Facility: CLINIC | Age: 82
End: 2017-10-04

## 2017-10-04 DIAGNOSIS — E03.4 HYPOTHYROIDISM DUE TO ACQUIRED ATROPHY OF THYROID: ICD-10-CM

## 2017-10-04 DIAGNOSIS — R73.02 GLUCOSE INTOLERANCE (IMPAIRED GLUCOSE TOLERANCE): ICD-10-CM

## 2017-10-04 DIAGNOSIS — I10 ESSENTIAL HYPERTENSION: ICD-10-CM

## 2017-10-04 LAB
ALBUMIN SERPL BCP-MCNC: 3.5 G/DL
ALP SERPL-CCNC: 64 U/L
ALT SERPL W/O P-5'-P-CCNC: 16 U/L
ANION GAP SERPL CALC-SCNC: 8 MMOL/L
AST SERPL-CCNC: 19 U/L
BASOPHILS # BLD AUTO: 0.04 K/UL
BASOPHILS NFR BLD: 0.6 %
BILIRUB SERPL-MCNC: 0.5 MG/DL
BUN SERPL-MCNC: 9 MG/DL
CALCIUM SERPL-MCNC: 8.9 MG/DL
CHLORIDE SERPL-SCNC: 106 MMOL/L
CHOLEST SERPL-MCNC: 227 MG/DL
CHOLEST/HDLC SERPL: 3 {RATIO}
CO2 SERPL-SCNC: 27 MMOL/L
CREAT SERPL-MCNC: 0.8 MG/DL
DIFFERENTIAL METHOD: ABNORMAL
EOSINOPHIL # BLD AUTO: 0.5 K/UL
EOSINOPHIL NFR BLD: 8.6 %
ERYTHROCYTE [DISTWIDTH] IN BLOOD BY AUTOMATED COUNT: 13.2 %
EST. GFR  (AFRICAN AMERICAN): >60 ML/MIN/1.73 M^2
EST. GFR  (NON AFRICAN AMERICAN): >60 ML/MIN/1.73 M^2
ESTIMATED AVG GLUCOSE: 117 MG/DL
GLUCOSE SERPL-MCNC: 119 MG/DL
HBA1C MFR BLD HPLC: 5.7 %
HCT VFR BLD AUTO: 42.4 %
HDLC SERPL-MCNC: 76 MG/DL
HDLC SERPL: 33.5 %
HGB BLD-MCNC: 14 G/DL
LDLC SERPL CALC-MCNC: 134.8 MG/DL
LYMPHOCYTES # BLD AUTO: 2.2 K/UL
LYMPHOCYTES NFR BLD: 35.3 %
MCH RBC QN AUTO: 30.9 PG
MCHC RBC AUTO-ENTMCNC: 33 G/DL
MCV RBC AUTO: 94 FL
MONOCYTES # BLD AUTO: 0.6 K/UL
MONOCYTES NFR BLD: 9.9 %
NEUTROPHILS # BLD AUTO: 2.8 K/UL
NEUTROPHILS NFR BLD: 45.4 %
NONHDLC SERPL-MCNC: 151 MG/DL
PLATELET # BLD AUTO: 268 K/UL
PMV BLD AUTO: 10.1 FL
POTASSIUM SERPL-SCNC: 3.9 MMOL/L
PROT SERPL-MCNC: 7 G/DL
RBC # BLD AUTO: 4.53 M/UL
SODIUM SERPL-SCNC: 141 MMOL/L
TRIGL SERPL-MCNC: 81 MG/DL
TSH SERPL DL<=0.005 MIU/L-ACNC: 3.03 UIU/ML
WBC # BLD AUTO: 6.18 K/UL

## 2017-10-04 PROCEDURE — 80053 COMPREHEN METABOLIC PANEL: CPT

## 2017-10-04 PROCEDURE — 36415 COLL VENOUS BLD VENIPUNCTURE: CPT | Mod: PO

## 2017-10-04 PROCEDURE — 80061 LIPID PANEL: CPT

## 2017-10-04 PROCEDURE — 83036 HEMOGLOBIN GLYCOSYLATED A1C: CPT

## 2017-10-04 PROCEDURE — 85025 COMPLETE CBC W/AUTO DIFF WBC: CPT

## 2017-10-04 PROCEDURE — 84443 ASSAY THYROID STIM HORMONE: CPT

## 2017-10-04 NOTE — TELEPHONE ENCOUNTER
----- Message from Devin Bernal MD sent at 10/4/2017  2:13 PM CDT -----  Call    Labs reassuring and within acceptable limits

## 2017-10-05 ENCOUNTER — IMMUNIZATION (OUTPATIENT)
Dept: FAMILY MEDICINE | Facility: CLINIC | Age: 82
End: 2017-10-05
Payer: MEDICARE

## 2017-10-05 DIAGNOSIS — Z23 NEED FOR PROPHYLACTIC VACCINATION AND INOCULATION AGAINST INFLUENZA: Primary | ICD-10-CM

## 2017-10-05 PROCEDURE — G0008 ADMIN INFLUENZA VIRUS VAC: HCPCS | Mod: PBBFAC,PO

## 2017-10-09 ENCOUNTER — OFFICE VISIT (OUTPATIENT)
Dept: HEMATOLOGY/ONCOLOGY | Facility: CLINIC | Age: 82
End: 2017-10-09
Payer: MEDICARE

## 2017-10-09 ENCOUNTER — OFFICE VISIT (OUTPATIENT)
Dept: FAMILY MEDICINE | Facility: CLINIC | Age: 82
End: 2017-10-09
Attending: FAMILY MEDICINE
Payer: MEDICARE

## 2017-10-09 VITALS
WEIGHT: 138 LBS | WEIGHT: 138 LBS | OXYGEN SATURATION: 97 % | BODY MASS INDEX: 22.99 KG/M2 | DIASTOLIC BLOOD PRESSURE: 69 MMHG | HEART RATE: 64 BPM | HEIGHT: 65 IN | RESPIRATION RATE: 98 BRPM | SYSTOLIC BLOOD PRESSURE: 121 MMHG | BODY MASS INDEX: 22.99 KG/M2 | DIASTOLIC BLOOD PRESSURE: 74 MMHG | TEMPERATURE: 98 F | HEART RATE: 73 BPM | SYSTOLIC BLOOD PRESSURE: 130 MMHG | HEIGHT: 65 IN

## 2017-10-09 DIAGNOSIS — F41.9 ANXIETY: ICD-10-CM

## 2017-10-09 DIAGNOSIS — F51.04 CHRONIC INSOMNIA: ICD-10-CM

## 2017-10-09 DIAGNOSIS — Z85.3 HISTORY OF BREAST CANCER: Primary | ICD-10-CM

## 2017-10-09 DIAGNOSIS — I10 ESSENTIAL HYPERTENSION: Primary | ICD-10-CM

## 2017-10-09 DIAGNOSIS — E03.4 HYPOTHYROIDISM DUE TO ACQUIRED ATROPHY OF THYROID: ICD-10-CM

## 2017-10-09 DIAGNOSIS — E89.41 HOT FLASHES DUE TO SURGICAL MENOPAUSE: ICD-10-CM

## 2017-10-09 DIAGNOSIS — R73.02 GLUCOSE INTOLERANCE (IMPAIRED GLUCOSE TOLERANCE): ICD-10-CM

## 2017-10-09 DIAGNOSIS — Z17.0 MALIGNANT NEOPLASM OF UPPER-OUTER QUADRANT OF LEFT BREAST IN FEMALE, ESTROGEN RECEPTOR POSITIVE: ICD-10-CM

## 2017-10-09 DIAGNOSIS — C50.412 MALIGNANT NEOPLASM OF UPPER-OUTER QUADRANT OF LEFT BREAST IN FEMALE, ESTROGEN RECEPTOR POSITIVE: ICD-10-CM

## 2017-10-09 PROCEDURE — 99213 OFFICE O/P EST LOW 20 MIN: CPT | Mod: PBBFAC,27,PO | Performed by: FAMILY MEDICINE

## 2017-10-09 PROCEDURE — 99999 PR PBB SHADOW E&M-EST. PATIENT-LVL II: CPT | Mod: PBBFAC,,, | Performed by: INTERNAL MEDICINE

## 2017-10-09 PROCEDURE — 99999 PR PBB SHADOW E&M-EST. PATIENT-LVL III: CPT | Mod: PBBFAC,,, | Performed by: FAMILY MEDICINE

## 2017-10-09 PROCEDURE — 99212 OFFICE O/P EST SF 10 MIN: CPT | Mod: PBBFAC,PO | Performed by: INTERNAL MEDICINE

## 2017-10-09 PROCEDURE — 99214 OFFICE O/P EST MOD 30 MIN: CPT | Mod: S$PBB,,, | Performed by: FAMILY MEDICINE

## 2017-10-09 PROCEDURE — 99213 OFFICE O/P EST LOW 20 MIN: CPT | Mod: S$PBB,,, | Performed by: INTERNAL MEDICINE

## 2017-10-09 RX ORDER — MIRTAZAPINE 15 MG/1
15 TABLET, FILM COATED ORAL NIGHTLY
Qty: 90 TABLET | Refills: 3 | Status: SHIPPED | OUTPATIENT
Start: 2017-10-09 | End: 2018-08-22 | Stop reason: SDUPTHER

## 2017-10-09 RX ORDER — ZOLPIDEM TARTRATE 10 MG/1
TABLET ORAL
Qty: 90 TABLET | Refills: 3 | Status: SHIPPED | OUTPATIENT
Start: 2017-10-09 | End: 2018-06-23 | Stop reason: SDUPTHER

## 2017-10-09 RX ORDER — LORAZEPAM 1 MG/1
1 TABLET ORAL NIGHTLY
Qty: 90 TABLET | Refills: 1 | Status: SHIPPED | OUTPATIENT
Start: 2017-10-09 | End: 2018-03-12 | Stop reason: SDUPTHER

## 2017-10-09 RX ORDER — LEVOTHYROXINE SODIUM 75 UG/1
75 TABLET ORAL
Qty: 90 TABLET | Refills: 3 | Status: SHIPPED | OUTPATIENT
Start: 2017-10-09 | End: 2018-08-22 | Stop reason: SDUPTHER

## 2017-10-09 NOTE — PROGRESS NOTES
Subjective:       Patient ID: Isabelle Roberts is a 83 y.o. female.    Chief Complaint: No chief complaint on file.    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   She had an ER/HI positive and Her-2/china negative tumor and hence was started on Arimidex in 06/2008, switched to Femara and then to Tamoxifen due to side-effects - which was eventually dc'd in July 2012.    She is here for a followup today.   She has hot flashes and uses premarin cream on a prn basis.      - her PCP.    Review of Systems    CONSTITUTIONAL: No weight loss or fevers.  HEME/LYMPH: No lymph node enlargements or bruises  CARDIOVASCULAR: No chest pain or palpitations.  RESPIRATORY: No cough, hemoptysis or dyspnea.  GASTROINTESTINAL: No abdominal pain, nausea or change in bowel habits.    Objective:      Physical Exam    GENERAL: Well-groomed, moderately-built. Vitals noted.  ENT&MOUTH: Oral mucosa moist. No thrush, gum bleeding or oral masses noted.  NECK: Supple without any masses. Thyroid is non-tender.  BREASTS: Did not reveal any masses, lesions or nipple inversion in either breast. There is no axillary lymphadenopathy. There is an old healed lumpectomy scar in the left breast in the 2 o'clock position and some chronic thickening around the left nipple area.  LYMPH NODES: None felt in the neck or supraclavicular areas.  LUNGS: Clear bilaterally without crackles or wheeze. Breathing nonlabored.  HEART: S1, S2 heard. Rhythm regular. No tachycardia or gallops. No pedal edema.   ABDOMEN: Soft and non-tender. No palpable masses, hepatosplenomegaly or ascites.    Assessment:       1. History of breast cancer        Plan:   Recent mammogram unremarkable.   DEXA scan in March 2013 was normal.   Physical exam is unremarkable.    She is doing well from a breast  cancer standpoint with no evidence of disease.   Continue premarin cream prn.     RTC in 1 yr.  Next mammogram due Oct 2018.

## 2017-10-09 NOTE — PROGRESS NOTES
Subjective:       Patient ID: Isabelle Roberts is a 83 y.o. female.    Chief Complaint: No chief complaint on file.    83 yr old pleasant white female with anxiety, insomnia, HTN, hypothyroidism, breast CA in remission, glaucoma, presents today for her 6 month follow up. She also wants med refill.    HTN - controlled- on norvasc and lisinopril daily - compliant - no side effects      Hypothyroidism - controlled - normal TSh - on synthroid - no side effects      Anxiety/insomnia - chronic and stable - takes ativan once a day and ambien at night and no side effects      Breast CA in remission - follows oncology      History as below - reviewed      Health maintenance  -labs UTD  -mammo UTD  -vaccines UTD          Medication Refill   This is a chronic problem. The current episode started more than 1 year ago. The problem occurs constantly. The problem has been gradually improving. Pertinent negatives include no arthralgias, chest pain, congestion, coughing, diaphoresis, headaches, joint swelling, myalgias, nausea, neck pain, rash, sore throat, visual change, vomiting or weakness. Nothing aggravates the symptoms. Treatments tried: as below. The treatment provided significant relief.   Hypertension   This is a chronic problem. The current episode started more than 1 year ago. The problem has been gradually improving since onset. The problem is controlled. Associated symptoms include anxiety. Pertinent negatives include no chest pain, headaches, malaise/fatigue, neck pain, palpitations, PND, shortness of breath or sweats. There are no associated agents to hypertension. Risk factors for coronary artery disease include post-menopausal state. Past treatments include calcium channel blockers and ACE inhibitors. The current treatment provides significant improvement. There are no compliance problems.  Hypertensive end-organ damage includes a thyroid problem. There is no history of angina, CAD/MI, CVA, left ventricular hypertrophy,  PVD or retinopathy. There is no history of chronic renal disease, coarctation of the aorta, a hypertension causing med or sleep apnea.   Thyroid Problem   Presents for follow-up visit. Symptoms include anxiety. Patient reports no cold intolerance, constipation, diaphoresis, diarrhea, heat intolerance, menstrual problem, palpitations, tremors, visual change or weight loss. The symptoms have been stable. Past treatments include levothyroxine. The treatment provided significant relief. Prior procedures include thyroid ultrasound. There is no history of atrial fibrillation, diabetes, Graves' ophthalmopathy, hyperlipidemia, neuropathy or osteopenia. There are no known risk factors.   Anxiety   Presents for follow-up visit. Symptoms include excessive worry and nervous/anxious behavior. Patient reports no chest pain, confusion, decreased concentration, dizziness, nausea, palpitations, shortness of breath or suicidal ideas. Symptoms occur most days. Nothing aggravates the symptoms. The quality of sleep is fair. Nighttime awakenings: occasional.     There are no known risk factors. Her past medical history is significant for anxiety/panic attacks. There is no history of asthma, bipolar disorder, CAD, chronic lung disease, depression, fibromyalgia or suicide attempts. Past treatments include benzodiazephines. The treatment provided significant relief. Compliance with prior treatments has been good. Compliance with medications is %.     Review of Systems   Constitutional: Negative.  Negative for activity change, diaphoresis, malaise/fatigue, unexpected weight change and weight loss.   HENT: Negative.  Negative for congestion, ear discharge, hearing loss, rhinorrhea, sore throat and voice change.    Eyes: Negative.  Negative for pain, discharge and visual disturbance.   Respiratory: Negative.  Negative for cough, chest tightness, shortness of breath and wheezing.    Cardiovascular: Negative.  Negative for chest pain,  palpitations and PND.   Gastrointestinal: Negative.  Negative for abdominal distention, anal bleeding, constipation, diarrhea, nausea and vomiting.   Endocrine: Negative.  Negative for cold intolerance, heat intolerance, polydipsia and polyuria.   Genitourinary: Negative.  Negative for decreased urine volume, difficulty urinating, dysuria, frequency, menstrual problem and vaginal pain.   Musculoskeletal: Negative.  Negative for arthralgias, gait problem, joint swelling, myalgias and neck pain.   Skin: Negative.  Negative for color change, pallor, rash and wound.   Allergic/Immunologic: Negative.  Negative for environmental allergies and immunocompromised state.   Neurological: Negative.  Negative for dizziness, tremors, seizures, speech difficulty, weakness and headaches.   Hematological: Negative.  Negative for adenopathy. Does not bruise/bleed easily.   Psychiatric/Behavioral: Negative for agitation, confusion, decreased concentration, hallucinations, self-injury and suicidal ideas. The patient is nervous/anxious.        PMH/PSH/FH/SH/MED/ALLERGY reviewed    Objective:       Vitals:    10/09/17 1338   BP: 130/74   Pulse: 73       Physical Exam   Constitutional: She is oriented to person, place, and time. She appears well-developed and well-nourished. No distress.   HENT:   Head: Normocephalic and atraumatic.   Right Ear: External ear normal.   Left Ear: External ear normal.   Nose: Nose normal.   Mouth/Throat: Oropharynx is clear and moist. No oropharyngeal exudate.   Eyes: Conjunctivae and EOM are normal. Pupils are equal, round, and reactive to light. Right eye exhibits no discharge. Left eye exhibits no discharge. No scleral icterus.   Neck: Normal range of motion. Neck supple. No JVD present. No tracheal deviation present. No thyromegaly present.   Cardiovascular: Normal rate, regular rhythm, normal heart sounds and intact distal pulses.  Exam reveals no gallop and no friction rub.    No murmur  heard.  Pulmonary/Chest: Effort normal and breath sounds normal. No stridor. She has no wheezes. She has no rales. She exhibits no tenderness.   Abdominal: Soft. Bowel sounds are normal. She exhibits no distension and no mass. There is no tenderness. There is no rebound and no guarding. No hernia.   Musculoskeletal: Normal range of motion. She exhibits no edema or tenderness.   Lymphadenopathy:     She has no cervical adenopathy.   Neurological: She is alert and oriented to person, place, and time. She has normal reflexes. She displays normal reflexes. No cranial nerve deficit. She exhibits normal muscle tone. Coordination normal.   Skin: Skin is warm and dry. No rash noted. She is not diaphoretic. No erythema. No pallor.   Psychiatric: She has a normal mood and affect. Her behavior is normal. Judgment and thought content normal.       Assessment:       1. Essential hypertension    2. Chronic insomnia    3. Anxiety    4. Malignant neoplasm of upper-outer quadrant of left breast in female, estrogen receptor positive    5. Hypothyroidism due to acquired atrophy of thyroid    6. Glucose intolerance (impaired glucose tolerance)        Plan:       Diagnoses and all orders for this visit:    Essential hypertension    Chronic insomnia  -     mirtazapine (REMERON) 15 MG tablet; Take 1 tablet (15 mg total) by mouth nightly.    Anxiety  -     lorazepam (ATIVAN) 1 MG tablet; Take 1 tablet (1 mg total) by mouth every evening.    Malignant neoplasm of upper-outer quadrant of left breast in female, estrogen receptor positive    Hypothyroidism due to acquired atrophy of thyroid  -     SYNTHROID 75 mcg tablet; Take 1 tablet (75 mcg total) by mouth before breakfast.    Glucose intolerance (impaired glucose tolerance)      HTN  -controlled  -refilled meds  -labs    Hypothyroidism  -controlled      Chronic insomnia  Ambien 10 prn  -sleep hygiene    Left breast CA  -in remission  -follows oncology    Anxiety  -controlled  -continue  REMERON  -ativan prn. Side effects of medications have been discussed and patient agreed to proceed with treatment and understands the risks and benefits.    Spent adequate time in obtaining history and explaining differentials    40 minutes spent during this visit of which greater than 50% devoted to face-face counseling and coordination of care regarding diagnosis and management plan    Return in about 6 months (around 4/9/2018), or if symptoms worsen or fail to improve.

## 2017-10-25 ENCOUNTER — OFFICE VISIT (OUTPATIENT)
Dept: INTERNAL MEDICINE | Facility: CLINIC | Age: 82
End: 2017-10-25
Payer: MEDICARE

## 2017-10-25 ENCOUNTER — TELEPHONE (OUTPATIENT)
Dept: FAMILY MEDICINE | Facility: CLINIC | Age: 82
End: 2017-10-25

## 2017-10-25 VITALS
SYSTOLIC BLOOD PRESSURE: 136 MMHG | OXYGEN SATURATION: 98 % | BODY MASS INDEX: 22.75 KG/M2 | HEIGHT: 66 IN | DIASTOLIC BLOOD PRESSURE: 64 MMHG | WEIGHT: 141.56 LBS | HEART RATE: 77 BPM

## 2017-10-25 DIAGNOSIS — N39.0 URINARY TRACT INFECTION WITHOUT HEMATURIA, SITE UNSPECIFIED: Primary | ICD-10-CM

## 2017-10-25 DIAGNOSIS — I10 HYPERTENSION, UNSPECIFIED TYPE: ICD-10-CM

## 2017-10-25 DIAGNOSIS — R30.0 DYSURIA: Primary | ICD-10-CM

## 2017-10-25 LAB
BACTERIA #/AREA URNS AUTO: ABNORMAL /HPF
BILIRUB UR QL STRIP: NEGATIVE
CLARITY UR: ABNORMAL
COLOR UR: YELLOW
GLUCOSE UR QL STRIP: NEGATIVE
HGB UR QL STRIP: ABNORMAL
KETONES UR QL STRIP: NEGATIVE
LEUKOCYTE ESTERASE UR QL STRIP: ABNORMAL
MICROSCOPIC COMMENT: ABNORMAL
NITRITE UR QL STRIP: NEGATIVE
PH UR STRIP: 8 [PH] (ref 5–8)
PROT UR QL STRIP: NEGATIVE
RBC #/AREA URNS AUTO: 95 /HPF (ref 0–4)
SP GR UR STRIP: 1.01 (ref 1–1.03)
SQUAMOUS #/AREA URNS AUTO: 8 /HPF
URN SPEC COLLECT METH UR: ABNORMAL
UROBILINOGEN UR STRIP-ACNC: NEGATIVE EU/DL
WBC #/AREA URNS AUTO: 71 /HPF (ref 0–5)

## 2017-10-25 PROCEDURE — 87086 URINE CULTURE/COLONY COUNT: CPT

## 2017-10-25 PROCEDURE — 81000 URINALYSIS NONAUTO W/SCOPE: CPT | Mod: PO

## 2017-10-25 PROCEDURE — 99213 OFFICE O/P EST LOW 20 MIN: CPT | Mod: S$PBB,,, | Performed by: INTERNAL MEDICINE

## 2017-10-25 PROCEDURE — 99999 PR PBB SHADOW E&M-EST. PATIENT-LVL IV: CPT | Mod: PBBFAC,,, | Performed by: INTERNAL MEDICINE

## 2017-10-25 PROCEDURE — 99214 OFFICE O/P EST MOD 30 MIN: CPT | Mod: PBBFAC,PO | Performed by: INTERNAL MEDICINE

## 2017-10-25 RX ORDER — NITROFURANTOIN 25; 75 MG/1; MG/1
100 CAPSULE ORAL 2 TIMES DAILY
Qty: 14 CAPSULE | Refills: 0 | Status: SHIPPED | OUTPATIENT
Start: 2017-10-25 | End: 2017-10-25

## 2017-10-25 RX ORDER — CIPROFLOXACIN 500 MG/1
500 TABLET ORAL 2 TIMES DAILY
Qty: 20 TABLET | Refills: 0 | Status: SHIPPED | OUTPATIENT
Start: 2017-10-25 | End: 2018-02-14 | Stop reason: SDUPTHER

## 2017-10-25 NOTE — PROGRESS NOTES
Pt. ID: Isabelle Roberts is a 83 y.o. female      Chief complaint:   Chief Complaint   Patient presents with    Urinary Tract Infection       HPI: Pt. Here for UTI; she reports dysuria and  Frequency for past 24 hours; she is compliant with meds; pt. Has a sulfa allergy      Review of Systems   Constitutional: Negative for chills and fever.   Respiratory: Negative for cough and shortness of breath.    Cardiovascular: Negative for chest pain.   Genitourinary: Positive for dysuria and frequency.   Psychiatric/Behavioral: Negative for depression. The patient is not nervous/anxious.          Objective:    Physical Exam   Constitutional: She is oriented to person, place, and time. She appears well-developed.   Eyes: EOM are normal.   Neck: Normal range of motion.   Cardiovascular: Normal rate, regular rhythm and normal heart sounds.    Pulmonary/Chest: Effort normal and breath sounds normal.   Abdominal: Soft. There is no tenderness. There is no rebound and no guarding.   Musculoskeletal: Normal range of motion.   Neurological: She is alert and oriented to person, place, and time.   Skin: No rash noted.         Health Maintenance   Topic Date Due    TETANUS VACCINE  01/15/2019    Lipid Panel  10/04/2022    Zoster Vaccine  Completed    Pneumococcal (65+)  Completed    Influenza Vaccine  Completed    DEXA SCAN  Excluded         Assessment:     1. Urinary tract infection without hematuria, site unspecified Active   2. Hypertension, unspecified type Well controlled         Plan: Urinary tract infection without hematuria, site unspecified  Comments:  f/u U/A which is pending and start macrobid x 7 days   Orders:  -     Urinalysis  -     Urine culture  -     nitrofurantoin, macrocrystal-monohydrate, (MACROBID) 100 MG capsule; Take 1 capsule (100 mg total) by mouth 2 (two) times daily.  Dispense: 14 capsule; Refill: 0    Hypertension, unspecified type  Comments:  continue current regimen and encouraged low Na diet     Other  orders  -     Urinalysis Microscopic        Problem List Items Addressed This Visit     None      Visit Diagnoses     Urinary tract infection without hematuria, site unspecified   (Active)  -  Primary    f/u U/A which is pending and start macrobid x 7 days     Relevant Medications    nitrofurantoin, macrocrystal-monohydrate, (MACROBID) 100 MG capsule    Other Relevant Orders    Urinalysis (Completed)    Urine culture    Hypertension, unspecified type   (Well controlled)      continue current regimen and encouraged low Na diet

## 2017-10-25 NOTE — TELEPHONE ENCOUNTER
----- Message from Janine Aguilar sent at 10/25/2017  9:39 AM CDT -----  No. [phone]  Patient has a bladder infection.   She would like to bring in a urine sample in today at [facility].   [facility]

## 2017-10-27 LAB
BACTERIA UR CULT: NORMAL
BACTERIA UR CULT: NORMAL

## 2017-11-20 DIAGNOSIS — L70.9 ACNE, UNSPECIFIED ACNE TYPE: ICD-10-CM

## 2017-11-20 RX ORDER — TRETINOIN 0.5 MG/G
CREAM TOPICAL
Qty: 20 G | Refills: 3 | Status: SHIPPED | OUTPATIENT
Start: 2017-11-20 | End: 2019-08-10 | Stop reason: SDUPTHER

## 2017-11-30 ENCOUNTER — TELEPHONE (OUTPATIENT)
Dept: OPHTHALMOLOGY | Facility: CLINIC | Age: 82
End: 2017-11-30

## 2017-12-15 ENCOUNTER — TELEPHONE (OUTPATIENT)
Dept: FAMILY MEDICINE | Facility: CLINIC | Age: 82
End: 2017-12-15

## 2017-12-15 NOTE — TELEPHONE ENCOUNTER
Informed pt that there is a new Shingles vaccine coming out, but it won't be on the market until Mid-January.

## 2017-12-15 NOTE — TELEPHONE ENCOUNTER
----- Message from Marianne Urias sent at 12/15/2017  2:56 PM CST -----  Contact: [phone]/self  Patient requesting to speak with you regarding a new shingles vaccine. Please advise.

## 2018-02-14 ENCOUNTER — TELEPHONE (OUTPATIENT)
Dept: FAMILY MEDICINE | Facility: CLINIC | Age: 83
End: 2018-02-14

## 2018-02-14 RX ORDER — CIPROFLOXACIN 500 MG/1
TABLET ORAL
Qty: 20 TABLET | Refills: 0 | Status: SHIPPED | OUTPATIENT
Start: 2018-02-14 | End: 2018-04-11 | Stop reason: SDUPTHER

## 2018-02-14 RX ORDER — CIPROFLOXACIN 500 MG/1
500 TABLET ORAL 2 TIMES DAILY
Qty: 20 TABLET | Refills: 0 | Status: SHIPPED | OUTPATIENT
Start: 2018-02-14 | End: 2018-04-11 | Stop reason: SDUPTHER

## 2018-02-14 NOTE — TELEPHONE ENCOUNTER
----- Message from Raz Beard sent at 2/14/2018  1:51 PM CST -----  Contact: self/[phone]  She needs to get an refill on the cipro; she has a uti.

## 2018-03-12 DIAGNOSIS — F41.9 ANXIETY: ICD-10-CM

## 2018-03-12 RX ORDER — LORAZEPAM 1 MG/1
TABLET ORAL
Qty: 90 TABLET | Refills: 0 | Status: SHIPPED | OUTPATIENT
Start: 2018-03-12 | End: 2018-06-29 | Stop reason: SDUPTHER

## 2018-04-05 ENCOUNTER — TELEPHONE (OUTPATIENT)
Dept: INTERNAL MEDICINE | Facility: CLINIC | Age: 83
End: 2018-04-05

## 2018-04-05 ENCOUNTER — OFFICE VISIT (OUTPATIENT)
Dept: INTERNAL MEDICINE | Facility: CLINIC | Age: 83
End: 2018-04-05
Payer: MEDICARE

## 2018-04-05 VITALS
SYSTOLIC BLOOD PRESSURE: 120 MMHG | HEART RATE: 74 BPM | OXYGEN SATURATION: 98 % | DIASTOLIC BLOOD PRESSURE: 60 MMHG | HEIGHT: 66 IN | WEIGHT: 139.75 LBS | BODY MASS INDEX: 22.46 KG/M2

## 2018-04-05 DIAGNOSIS — R39.89 SUSPECTED UTI: Primary | ICD-10-CM

## 2018-04-05 LAB
BACTERIA #/AREA URNS AUTO: ABNORMAL /HPF
BILIRUB UR QL STRIP: NEGATIVE
CLARITY UR: CLEAR
COLOR UR: YELLOW
GLUCOSE UR QL STRIP: NEGATIVE
HGB UR QL STRIP: ABNORMAL
KETONES UR QL STRIP: NEGATIVE
LEUKOCYTE ESTERASE UR QL STRIP: ABNORMAL
MICROSCOPIC COMMENT: ABNORMAL
NITRITE UR QL STRIP: NEGATIVE
PH UR STRIP: 7 [PH] (ref 5–8)
PROT UR QL STRIP: NEGATIVE
RBC #/AREA URNS AUTO: 5 /HPF (ref 0–4)
SP GR UR STRIP: 1 (ref 1–1.03)
SQUAMOUS #/AREA URNS AUTO: 8 /HPF
URN SPEC COLLECT METH UR: ABNORMAL
UROBILINOGEN UR STRIP-ACNC: ABNORMAL EU/DL
WBC #/AREA URNS AUTO: 28 /HPF (ref 0–5)

## 2018-04-05 PROCEDURE — 99213 OFFICE O/P EST LOW 20 MIN: CPT | Mod: S$PBB,,, | Performed by: INTERNAL MEDICINE

## 2018-04-05 PROCEDURE — 81000 URINALYSIS NONAUTO W/SCOPE: CPT | Mod: PO

## 2018-04-05 PROCEDURE — 87086 URINE CULTURE/COLONY COUNT: CPT

## 2018-04-05 PROCEDURE — 99214 OFFICE O/P EST MOD 30 MIN: CPT | Mod: PBBFAC,PO | Performed by: INTERNAL MEDICINE

## 2018-04-05 PROCEDURE — 99999 PR PBB SHADOW E&M-EST. PATIENT-LVL IV: CPT | Mod: PBBFAC,,, | Performed by: INTERNAL MEDICINE

## 2018-04-05 RX ORDER — CIPROFLOXACIN 250 MG/1
250 TABLET, FILM COATED ORAL EVERY 12 HOURS
Qty: 10 TABLET | Refills: 0 | Status: SHIPPED | OUTPATIENT
Start: 2018-04-05 | End: 2018-04-10

## 2018-04-05 RX ORDER — PHENAZOPYRIDINE HYDROCHLORIDE 200 MG/1
200 TABLET, FILM COATED ORAL 3 TIMES DAILY PRN
Qty: 15 TABLET | Refills: 0 | Status: SHIPPED | OUTPATIENT
Start: 2018-04-05 | End: 2018-04-08

## 2018-04-05 NOTE — TELEPHONE ENCOUNTER
----- Message from Melonie King MD sent at 4/5/2018 12:29 PM CDT -----  Please let Mrs. Roberts know that her urinalysis shows white blood cells and some blood, which can both be seen with infection. We are awaiting final results of the urine culture.    Thanks

## 2018-04-05 NOTE — PROGRESS NOTES
Subjective:       Patient ID: Isabelle Roberts is a 84 y.o. female.    Chief Complaint: Cystitis    HPI Mrs. Roberts is a 84 year old female who presents with a chief complaint of cystitis.  She is a patient of Dr. Bernal and today's her first visit with me.  She woke up this morning and felt like she had cystitis.  She has pain with urination.  She also feels an urgency to urinate.  She feels like she is urinating more frequently.  She took one dose of ciprofloxacin this morning which she had left over from a prior prescription for treatment of urinary tract infection. Otherwise she has taken no medications to relieve her pain.  Nothing is making her symptoms better. She reports a history of frequent urinary tract infections occurring every 6 months or so.   Her symptom is worsening and persistent.  She does not have any associated fever or back/flank pain.    Review of Systems   Constitutional: Negative for fever.   Genitourinary: Positive for dysuria, frequency and urgency. Negative for hematuria.   Musculoskeletal: Negative for back pain.       Objective:      Physical Exam   Constitutional: She is oriented to person, place, and time. She appears well-developed and well-nourished. No distress.   Cardiovascular: Normal rate, regular rhythm, normal heart sounds and intact distal pulses.  Exam reveals no gallop and no friction rub.    No murmur heard.  Musculoskeletal: She exhibits no edema or deformity.   Neurological: She is alert and oriented to person, place, and time.   Skin: Skin is warm and dry. She is not diaphoretic.   Psychiatric: She has a normal mood and affect. Her behavior is normal. Judgment and thought content normal.   Vitals reviewed.      Assessment:       1. Suspected UTI        Plan:     #1 suspected urinary tract infection  Ciprofloxacin 500 mg by mouth twice a day ×5 days  Pyridium 200 mg by mouth 3 times a day when necessary for pain  Urinalysis and urine culture pending.    RTC PRN

## 2018-04-05 NOTE — PATIENT INSTRUCTIONS
Ciprofloxacin tablets  What is this medicine?  CIPROFLOXACIN (sip porfirio FLOX a sin) is a quinolone antibiotic. It is used to treat certain kinds of bacterial infections. It will not work for colds, flu, or other viral infections.  How should I use this medicine?  Take this medicine by mouth with a glass of water. Follow the directions on the prescription label. Take your medicine at regular intervals. Do not take your medicine more often than directed. Take all of your medicine as directed even if you think your are better. Do not skip doses or stop your medicine early.  You can take this medicine with food or on an empty stomach. It can be taken with a meal that contains dairy or calcium, but do not take it alone with a dairy product, like milk or yogurt or calcium-fortified juice.  A special MedGuide will be given to you by the pharmacist with each prescription and refill. Be sure to read this information carefully each time.  Talk to your pediatrician regarding the use of this medicine in children. Special care may be needed.  What side effects may I notice from receiving this medicine?  Side effects that you should report to your doctor or health care professional as soon as possible:  · allergic reactions like skin rash or hives, swelling of the face, lips, or tongue  · anxious  · confusion  · depressed mood  · diarrhea  · fast, irregular heartbeat  · hallucination, loss of contact with reality  · joint, muscle, or tendon pain or swelling  · pain, tingling, numbness in the hands or feet  · suicidal thoughts or other mood changes  · sunburn  · unusually weak or tired  Side effects that usually do not require medical attention (report to your doctor or health care professional if they continue or are bothersome):  · dry mouth  · headache  · nausea  · trouble sleeping  What may interact with this medicine?  Do not take this medicine with any of the following  medications:  · cisapride  · droperidol  · terfenadine  · tizanidine  This medicine may also interact with the following medications:  · antacids  · birth control pills  · caffeine  · cyclosporin  · didanosine (ddI) buffered tablets or powder  · medicines for diabetes  · medicines for inflammation like ibuprofen, naproxen  · methotrexate  · multivitamins  · omeprazole  · phenytoin  · probenecid  · sucralfate  · theophylline  · warfarin  What if I miss a dose?  If you miss a dose, take it as soon as you can. If it is almost time for your next dose, take only that dose. Do not take double or extra doses.  Where should I keep my medicine?  Keep out of the reach of children.  Store at room temperature below 30 degrees C (86 degrees F). Keep container tightly closed. Throw away any unused medicine after the expiration date.  What should I tell my health care provider before I take this medicine?  They need to know if you have any of these conditions:  · bone problems  · cerebral disease  · history of low levels of potassium in the blood  · joint problems  · irregular heartbeat  · kidney disease  · myasthenia gravis  · seizures  · tendon problems  · tingling of the fingers or toes, or other nerve disorder  · an unusual or allergic reaction to ciprofloxacin, other antibiotics or medicines, foods, dyes, or preservatives  · pregnant or trying to get pregnant  · breast-feeding  What should I watch for while using this medicine?  Tell your doctor or health care professional if your symptoms do not improve.  Do not treat diarrhea with over the counter products. Contact your doctor if you have diarrhea that lasts more than 2 days or if it is severe and watery.  You may get drowsy or dizzy. Do not drive, use machinery, or do anything that needs mental alertness until you know how this medicine affects you. Do not stand or sit up quickly, especially if you are an older patient. This reduces the risk of dizzy or fainting  spells.  This medicine can make you more sensitive to the sun. Keep out of the sun. If you cannot avoid being in the sun, wear protective clothing and use sunscreen. Do not use sun lamps or tanning beds/booths.  Avoid antacids, aluminum, calcium, iron, magnesium, and zinc products for 6 hours before and 2 hours after taking a dose of this medicine.  NOTE:This sheet is a summary. It may not cover all possible information. If you have questions about this medicine, talk to your doctor, pharmacist, or health care provider. Copyright© 2017 [employer]

## 2018-04-06 ENCOUNTER — DOCUMENTATION ONLY (OUTPATIENT)
Dept: FAMILY MEDICINE | Facility: CLINIC | Age: 83
End: 2018-04-06

## 2018-04-06 ENCOUNTER — TELEPHONE (OUTPATIENT)
Dept: INTERNAL MEDICINE | Facility: CLINIC | Age: 83
End: 2018-04-06

## 2018-04-06 ENCOUNTER — TELEPHONE (OUTPATIENT)
Dept: FAMILY MEDICINE | Facility: CLINIC | Age: 83
End: 2018-04-06

## 2018-04-06 LAB — BACTERIA UR CULT: NORMAL

## 2018-04-06 NOTE — TELEPHONE ENCOUNTER
----- Message from Melonie King MD sent at 4/6/2018  1:02 PM CDT -----  Please see if there is a preliminary result on Mrs. Roberts's urine culture.    Thank you

## 2018-04-06 NOTE — TELEPHONE ENCOUNTER
----- Message from Rachel Cabrera sent at 4/6/2018 10:18 AM CDT -----  Contact: Self [phone]  Patient is calling to talk to nurse concerning questions on her medication ciprofloxacin HCl (CIPRO) 250 MG tablet. Please advice

## 2018-04-06 NOTE — TELEPHONE ENCOUNTER
Called labs [id number], spoke to mrs Wheeler who adv she wasn't here yesterday when pt brought specimen to lab and she will so some research and then call me back! I verbalized understanding!

## 2018-04-06 NOTE — TELEPHONE ENCOUNTER
Spoke to dwain from the lab again who adv that the urine culture takes a while up to 24 to 48 hours, she adv the lab puts them on a plate an incubates them. I adv her we will check tomorrow again for results! But specimen was received for the test!

## 2018-04-06 NOTE — TELEPHONE ENCOUNTER
Spoke to pt and states that she was seen yesterday for UTI. Pt was prescribed Cipro 250mg. Pt stated that she did not feel like the medication prescribed was strong enough. That the physician was young and may not know what to prescribe. Informed pt to follow prescribing physician orders for taking the medication and if she is not feeling better in a couple of days to call the office back.

## 2018-04-09 ENCOUNTER — TELEPHONE (OUTPATIENT)
Dept: INTERNAL MEDICINE | Facility: CLINIC | Age: 83
End: 2018-04-09

## 2018-04-09 ENCOUNTER — TELEPHONE (OUTPATIENT)
Dept: FAMILY MEDICINE | Facility: CLINIC | Age: 83
End: 2018-04-09

## 2018-04-09 NOTE — TELEPHONE ENCOUNTER
Spoke to pt adv that urine culture was negative for infection but urinalysis had blood in urine and adv pt that she should f/u with Dr. Bernal for that issue. Pt verbalizes and understanding with an ok.

## 2018-04-09 NOTE — TELEPHONE ENCOUNTER
----- Message from Melonie King MD sent at 4/9/2018  8:20 AM CDT -----  Please let Mrs. Roberts know that her urine culture final result was negative for infection.    There was however blood in the urinalysis. Therefore I recommend she follow up with her PCP-Dr. Bernal regarding this issue.    Thanks

## 2018-04-09 NOTE — TELEPHONE ENCOUNTER
----- Message from Raz Beard sent at 4/9/2018  9:19 AM CDT -----  Contact: self/[phone]  They found blood in her urine and she needs an appointment asap in the afternoon.

## 2018-04-09 NOTE — TELEPHONE ENCOUNTER
----- Message from Janine Aguilar sent at 4/9/2018  1:55 PM CDT -----  No. [phone]  FYI    Patient called to confirm her appointment on 4/11/18.  She was told it is for 4:00.

## 2018-04-10 RX ORDER — TIMOLOL MALEATE 5 MG/ML
SOLUTION/ DROPS OPHTHALMIC
COMMUNITY
Start: 2018-02-16 | End: 2019-02-05

## 2018-04-10 RX ORDER — DORZOLAMIDE HCL 20 MG/ML
SOLUTION/ DROPS OPHTHALMIC
COMMUNITY
Start: 2018-02-17 | End: 2019-02-05

## 2018-04-11 ENCOUNTER — TELEPHONE (OUTPATIENT)
Dept: FAMILY MEDICINE | Facility: CLINIC | Age: 83
End: 2018-04-11

## 2018-04-11 ENCOUNTER — OFFICE VISIT (OUTPATIENT)
Dept: FAMILY MEDICINE | Facility: CLINIC | Age: 83
End: 2018-04-11
Attending: FAMILY MEDICINE
Payer: MEDICARE

## 2018-04-11 VITALS
WEIGHT: 138.69 LBS | HEART RATE: 84 BPM | TEMPERATURE: 98 F | HEIGHT: 66 IN | DIASTOLIC BLOOD PRESSURE: 72 MMHG | SYSTOLIC BLOOD PRESSURE: 112 MMHG | OXYGEN SATURATION: 97 % | BODY MASS INDEX: 22.29 KG/M2

## 2018-04-11 DIAGNOSIS — R73.02 GLUCOSE INTOLERANCE (IMPAIRED GLUCOSE TOLERANCE): ICD-10-CM

## 2018-04-11 DIAGNOSIS — C50.412 MALIGNANT NEOPLASM OF UPPER-OUTER QUADRANT OF LEFT BREAST IN FEMALE, ESTROGEN RECEPTOR POSITIVE: ICD-10-CM

## 2018-04-11 DIAGNOSIS — N39.0 RECURRENT UTI: Primary | ICD-10-CM

## 2018-04-11 DIAGNOSIS — Z17.0 MALIGNANT NEOPLASM OF UPPER-OUTER QUADRANT OF LEFT BREAST IN FEMALE, ESTROGEN RECEPTOR POSITIVE: ICD-10-CM

## 2018-04-11 DIAGNOSIS — I10 ESSENTIAL HYPERTENSION: ICD-10-CM

## 2018-04-11 DIAGNOSIS — E03.4 HYPOTHYROIDISM DUE TO ACQUIRED ATROPHY OF THYROID: ICD-10-CM

## 2018-04-11 DIAGNOSIS — F41.9 ANXIETY: ICD-10-CM

## 2018-04-11 PROCEDURE — 99999 PR PBB SHADOW E&M-EST. PATIENT-LVL III: CPT | Mod: PBBFAC,,, | Performed by: FAMILY MEDICINE

## 2018-04-11 PROCEDURE — 99213 OFFICE O/P EST LOW 20 MIN: CPT | Mod: PBBFAC,PO | Performed by: FAMILY MEDICINE

## 2018-04-11 PROCEDURE — 99214 OFFICE O/P EST MOD 30 MIN: CPT | Mod: S$PBB,,, | Performed by: FAMILY MEDICINE

## 2018-04-11 RX ORDER — CIPROFLOXACIN 500 MG/1
TABLET ORAL
Qty: 60 TABLET | Refills: 2 | Status: SHIPPED | OUTPATIENT
Start: 2018-04-11 | End: 2018-06-20

## 2018-04-11 NOTE — TELEPHONE ENCOUNTER
----- Message from Luis Robb sent at 4/11/2018  4:25 PM CDT -----  Contact: [phone]/self  Pt states she's returning your call  Please call and advise

## 2018-04-11 NOTE — PROGRESS NOTES
Subjective:       Patient ID: Isabelle Roberts is a 84 y.o. female.    Chief Complaint: Follow-up (UTI)    83 yr old pleasant white female with recurrent/chronic UTI, anxiety, insomnia, HTN, hypothyroidism, breast CA in remission, glaucoma, presents today for her 6 month follow up. She also wants med refill.    HTN - controlled- on norvasc and lisinopril daily - compliant - no side effects      Hypothyroidism - controlled - normal TSh - on synthroid - no side effects      Anxiety/insomnia - chronic and stable - takes ativan once a day and ambien at night and no side effects      Breast CA in remission - follows oncology      UTI - recurrent - gets infections every few months - want to have antibiotic handy - declines need for urology      History as below - reviewed      Health maintenance  -labs UTD  -mammo UTD  -vaccines UTD          Medication Refill   This is a chronic problem. The current episode started more than 1 year ago. The problem occurs constantly. The problem has been gradually improving. Pertinent negatives include no arthralgias, chest pain, congestion, coughing, diaphoresis, headaches, joint swelling, myalgias, nausea, neck pain, rash, sore throat, visual change, vomiting or weakness. Nothing aggravates the symptoms. Treatments tried: as below. The treatment provided significant relief.   Hypertension   This is a chronic problem. The current episode started more than 1 year ago. The problem has been gradually improving since onset. The problem is controlled. Associated symptoms include anxiety. Pertinent negatives include no chest pain, headaches, malaise/fatigue, neck pain, palpitations, PND, shortness of breath or sweats. There are no associated agents to hypertension. Risk factors for coronary artery disease include post-menopausal state. Past treatments include calcium channel blockers and ACE inhibitors. The current treatment provides significant improvement. There are no compliance problems.   There is no history of angina, CAD/MI, CVA, left ventricular hypertrophy, PVD or retinopathy. Identifiable causes of hypertension include a thyroid problem. There is no history of chronic renal disease, coarctation of the aorta, a hypertension causing med or sleep apnea.   Thyroid Problem   Presents for follow-up visit. Symptoms include anxiety. Patient reports no cold intolerance, constipation, diaphoresis, diarrhea, heat intolerance, menstrual problem, palpitations, tremors, visual change or weight loss. The symptoms have been stable. Past treatments include levothyroxine. The treatment provided significant relief. Prior procedures include thyroid ultrasound. There is no history of atrial fibrillation, diabetes, Graves' ophthalmopathy, hyperlipidemia, neuropathy or osteopenia. There are no known risk factors.   Anxiety   Presents for follow-up visit. Symptoms include excessive worry and nervous/anxious behavior. Patient reports no chest pain, confusion, decreased concentration, dizziness, nausea, palpitations, shortness of breath or suicidal ideas. Symptoms occur most days. Nothing aggravates the symptoms. The quality of sleep is fair. Nighttime awakenings: occasional.     There are no known risk factors. Her past medical history is significant for anxiety/panic attacks. There is no history of asthma, bipolar disorder, CAD, chronic lung disease, depression, fibromyalgia or suicide attempts. Past treatments include benzodiazephines. The treatment provided significant relief. Compliance with prior treatments has been good. Compliance with medications is %.     Review of Systems   Constitutional: Negative.  Negative for activity change, diaphoresis, malaise/fatigue, unexpected weight change and weight loss.   HENT: Negative.  Negative for congestion, ear discharge, hearing loss, rhinorrhea, sore throat and voice change.    Eyes: Negative.  Negative for pain, discharge and visual disturbance.   Respiratory:  Negative.  Negative for cough, chest tightness, shortness of breath and wheezing.    Cardiovascular: Negative.  Negative for chest pain, palpitations and PND.   Gastrointestinal: Negative.  Negative for abdominal distention, anal bleeding, constipation, diarrhea, nausea and vomiting.   Endocrine: Negative.  Negative for cold intolerance, heat intolerance, polydipsia and polyuria.   Genitourinary: Negative.  Negative for decreased urine volume, difficulty urinating, dysuria, frequency, menstrual problem and vaginal pain.   Musculoskeletal: Negative.  Negative for arthralgias, gait problem, joint swelling, myalgias and neck pain.   Skin: Negative.  Negative for color change, pallor, rash and wound.   Allergic/Immunologic: Negative.  Negative for environmental allergies and immunocompromised state.   Neurological: Negative.  Negative for dizziness, tremors, seizures, speech difficulty, weakness and headaches.   Hematological: Negative.  Negative for adenopathy. Does not bruise/bleed easily.   Psychiatric/Behavioral: Negative for agitation, confusion, decreased concentration, hallucinations, self-injury and suicidal ideas. The patient is nervous/anxious.        PMH/PSH/FH/SH/MED/ALLERGY reviewed    Objective:       Vitals:    04/11/18 1531   BP: 112/72   Pulse: 84   Temp: 97.9 °F (36.6 °C)       Physical Exam   Constitutional: She is oriented to person, place, and time. She appears well-developed and well-nourished. No distress.   HENT:   Head: Normocephalic and atraumatic.   Right Ear: External ear normal.   Left Ear: External ear normal.   Nose: Nose normal.   Mouth/Throat: Oropharynx is clear and moist. No oropharyngeal exudate.   Eyes: Conjunctivae and EOM are normal. Pupils are equal, round, and reactive to light. Right eye exhibits no discharge. Left eye exhibits no discharge. No scleral icterus.   Neck: Normal range of motion. Neck supple. No JVD present. No tracheal deviation present. No thyromegaly present.    Cardiovascular: Normal rate, regular rhythm, normal heart sounds and intact distal pulses.  Exam reveals no gallop and no friction rub.    No murmur heard.  Pulmonary/Chest: Effort normal and breath sounds normal. No stridor. She has no wheezes. She has no rales. She exhibits no tenderness.   Abdominal: Soft. Bowel sounds are normal. She exhibits no distension and no mass. There is no tenderness. There is no rebound and no guarding. No hernia.   Musculoskeletal: Normal range of motion. She exhibits no edema or tenderness.   Lymphadenopathy:     She has no cervical adenopathy.   Neurological: She is alert and oriented to person, place, and time. She has normal reflexes. She displays normal reflexes. No cranial nerve deficit. She exhibits normal muscle tone. Coordination normal.   Skin: Skin is warm and dry. No rash noted. She is not diaphoretic. No erythema. No pallor.   Psychiatric: She has a normal mood and affect. Her behavior is normal. Judgment and thought content normal.       Assessment:       1. Recurrent UTI    2. Malignant neoplasm of upper-outer quadrant of left breast in female, estrogen receptor positive    3. Hypothyroidism due to acquired atrophy of thyroid    4. Glucose intolerance (impaired glucose tolerance)    5. Essential hypertension    6. Anxiety        Plan:       Isabelle was seen today for follow-up.    Diagnoses and all orders for this visit:    Recurrent UTI  -     ciprofloxacin HCl (CIPRO) 500 MG tablet; TAKE ONE TABLET (500 MG TOTAL) BY MOUTH TWICE DAILY    Malignant neoplasm of upper-outer quadrant of left breast in female, estrogen receptor positive    Hypothyroidism due to acquired atrophy of thyroid    Glucose intolerance (impaired glucose tolerance)    Essential hypertension    Anxiety      Recurrent UTI  -cipro when needed x 10 days  -UA n culture when have symptoms    HTN  -controlled  -refilled meds  -labs    Hypothyroidism  -controlled      Chronic insomnia  Ambien 10 prn  -sleep  hygiene    Left breast CA  -in remission  -follows oncology    Anxiety  -controlled  -continue REMERON  -ativan prn. Side effects of medications have been discussed and patient agreed to proceed with treatment and understands the risks and benefits.    Spent adequate time in obtaining history and explaining differentials    40 minutes spent during this visit of which greater than 50% devoted to face-face counseling and coordination of care regarding diagnosis and management plan    Follow-up in about 6 months (around 10/11/2018), or if symptoms worsen or fail to improve.

## 2018-04-16 DIAGNOSIS — I10 ESSENTIAL HYPERTENSION: ICD-10-CM

## 2018-04-16 RX ORDER — AMLODIPINE BESYLATE 10 MG/1
TABLET ORAL
Qty: 90 TABLET | Refills: 3 | Status: SHIPPED | OUTPATIENT
Start: 2018-04-16 | End: 2019-03-23 | Stop reason: SDUPTHER

## 2018-05-29 DIAGNOSIS — I10 ESSENTIAL HYPERTENSION: ICD-10-CM

## 2018-05-29 RX ORDER — BENAZEPRIL HYDROCHLORIDE 40 MG/1
TABLET ORAL
Qty: 90 TABLET | Refills: 3 | Status: SHIPPED | OUTPATIENT
Start: 2018-05-29 | End: 2019-05-19 | Stop reason: SDUPTHER

## 2018-06-19 ENCOUNTER — TELEPHONE (OUTPATIENT)
Dept: FAMILY MEDICINE | Facility: CLINIC | Age: 83
End: 2018-06-19

## 2018-06-19 NOTE — TELEPHONE ENCOUNTER
Called patient to inform that pre op clearance is schedule for 07/09/18.  Patient states that she will probably be out of town during that time, but will give us a call back if rescheduling is needed.

## 2018-06-19 NOTE — TELEPHONE ENCOUNTER
----- Message from Joan London sent at 6/19/2018 12:21 PM CDT -----  Contact: [phone]/ self   Patient called in returning your call. Please advise.

## 2018-06-19 NOTE — TELEPHONE ENCOUNTER
Called patient to schedule pre op clearance.  Scheduled appointment for 07/09/18 at 11:00am.  No answer, left voicemail requesting a call back.

## 2018-06-19 NOTE — TELEPHONE ENCOUNTER
----- Message from Janine Aguilar sent at 6/19/2018  9:43 AM CDT -----  No. [phone]    Patient is having eye surgery on 7/17/18.    She needs surgery clearance.  Her last appointment was in April.   Does she need to come in.

## 2018-06-20 ENCOUNTER — HOSPITAL ENCOUNTER (OUTPATIENT)
Dept: RADIOLOGY | Facility: HOSPITAL | Age: 83
Discharge: HOME | End: 2018-06-20
Attending: FAMILY MEDICINE
Payer: MEDICARE

## 2018-06-20 ENCOUNTER — OFFICE VISIT (OUTPATIENT)
Dept: FAMILY MEDICINE | Facility: CLINIC | Age: 83
End: 2018-06-20
Payer: MEDICARE

## 2018-06-20 VITALS
OXYGEN SATURATION: 97 % | HEART RATE: 69 BPM | WEIGHT: 137.81 LBS | SYSTOLIC BLOOD PRESSURE: 118 MMHG | BODY MASS INDEX: 22.15 KG/M2 | TEMPERATURE: 98 F | DIASTOLIC BLOOD PRESSURE: 70 MMHG | HEIGHT: 66 IN

## 2018-06-20 DIAGNOSIS — F51.04 CHRONIC INSOMNIA: ICD-10-CM

## 2018-06-20 DIAGNOSIS — I10 ESSENTIAL HYPERTENSION: ICD-10-CM

## 2018-06-20 DIAGNOSIS — Z17.0 MALIGNANT NEOPLASM OF UPPER-OUTER QUADRANT OF LEFT BREAST IN FEMALE, ESTROGEN RECEPTOR POSITIVE: ICD-10-CM

## 2018-06-20 DIAGNOSIS — C50.412 MALIGNANT NEOPLASM OF UPPER-OUTER QUADRANT OF LEFT BREAST IN FEMALE, ESTROGEN RECEPTOR POSITIVE: ICD-10-CM

## 2018-06-20 DIAGNOSIS — Z01.818 PRE-OPERATIVE CLEARANCE: Primary | ICD-10-CM

## 2018-06-20 DIAGNOSIS — F41.9 ANXIETY: ICD-10-CM

## 2018-06-20 DIAGNOSIS — N39.0 RECURRENT UTI: ICD-10-CM

## 2018-06-20 DIAGNOSIS — E03.4 HYPOTHYROIDISM DUE TO ACQUIRED ATROPHY OF THYROID: ICD-10-CM

## 2018-06-20 DIAGNOSIS — Z01.818 PRE-OPERATIVE CLEARANCE: ICD-10-CM

## 2018-06-20 PROCEDURE — 93005 ELECTROCARDIOGRAM TRACING: CPT | Mod: PBBFAC,PO | Performed by: FAMILY MEDICINE

## 2018-06-20 PROCEDURE — 71046 X-RAY EXAM CHEST 2 VIEWS: CPT | Mod: 26,,, | Performed by: RADIOLOGY

## 2018-06-20 PROCEDURE — 71046 X-RAY EXAM CHEST 2 VIEWS: CPT | Mod: TC,FY,PO

## 2018-06-20 PROCEDURE — 93010 ELECTROCARDIOGRAM REPORT: CPT | Mod: ,,, | Performed by: INTERNAL MEDICINE

## 2018-06-20 PROCEDURE — 99999 PR PBB SHADOW E&M-EST. PATIENT-LVL III: CPT | Mod: PBBFAC,,, | Performed by: FAMILY MEDICINE

## 2018-06-20 PROCEDURE — 99214 OFFICE O/P EST MOD 30 MIN: CPT | Mod: S$PBB,,, | Performed by: FAMILY MEDICINE

## 2018-06-20 PROCEDURE — 99213 OFFICE O/P EST LOW 20 MIN: CPT | Mod: PBBFAC,PO,25 | Performed by: FAMILY MEDICINE

## 2018-06-20 NOTE — PROGRESS NOTES
Subjective:       Patient ID: Isabelle Roberts is a 84 y.o. female.    Chief Complaint: preoperative clearance     Isabelle Roberts is a 84 y.o. female who presents to the office today for an urgent care visit for a preoperative consultation at the request of Dr. Castro who plans on performing cosmetic upper Blepharoplasty on July 17. This consultation is requested for the specific conditions prompting preoperative evaluation (i.e. because of potential affect on operative risk): none. Planned anesthesia: MAC. The patient has the following known anesthesia issues: none. Patients bleeding risk: no recent abnormal bleeding and no remote history of abnormal bleeding. Patient does not have objections to receiving blood products if needed.    She walks 3 miles, 5 times a week.     The following portions of the patient's history were reviewed and updated as appropriate: allergies, current medications, past family history, past medical history, past social history, past surgical history and problem list.        Review of Systems   Constitutional: Negative for chills and fever.   Respiratory: Negative for cough and shortness of breath.    Cardiovascular: Negative for chest pain and leg swelling.   Gastrointestinal: Negative for constipation, diarrhea, nausea and vomiting.   Genitourinary: Negative for difficulty urinating and dysuria.   Skin: Negative for rash.       Objective:     Vitals:    06/20/18 1122   BP: 118/70   Pulse: 69   Temp: 98.4 °F (36.9 °C)        Physical Exam   Constitutional: She is oriented to person, place, and time. She appears well-developed and well-nourished. No distress.   HENT:   Head: Normocephalic and atraumatic.   Eyes: Conjunctivae are normal.   Neck: Normal range of motion. Neck supple.   Cardiovascular: Normal rate and regular rhythm.    Pulmonary/Chest: Effort normal and breath sounds normal.   Abdominal: Soft. There is no tenderness. There is no guarding.   Musculoskeletal: She exhibits no edema.    Neurological: She is alert and oriented to person, place, and time.   Skin: Skin is warm. She is not diaphoretic.   Psychiatric: She has a normal mood and affect. Her behavior is normal. Judgment and thought content normal.   Nursing note and vitals reviewed.      Assessment:       1. Pre-operative clearance    2. Essential hypertension    3. Anxiety    4. Recurrent UTI    5. Malignant neoplasm of upper-outer quadrant of left breast in female, estrogen receptor positive    6. Hypothyroidism due to acquired atrophy of thyroid    7. Chronic insomnia        Plan:       Pre-operative clearance  84 y.o. female with planned surgery as above.    Known risk factors for perioperative complications: None    Difficulty with intubation is not anticipated.  Cardiac Risk Estimation: 0.4 %. Class 1 Risk  ECG: no prior ECG. Formal cardiology read pending  Chest x-ray: pending   Labs: ordered    Current medications which may produce withdrawal symptoms if withheld perioperatively: Ambien, Synthroid, Remeron, Ativan    1. Preoperative workup as follows chest x-ray, ECG, hemoglobin, electrolytes, creatinine.  2. Change in medication regimen before surgery: none, continue medication regimen including morning of surgery, with sip of water.  3. Prophylaxis for cardiac events with perioperative beta-blockers: should be considered, specific regimen per anesthesia.  4. Invasive hemodynamic monitoring perioperatively: at the discretion of anesthesiologist.  5. Deep vein thrombosis prophylaxis postoperatively:regimen to be chosen by surgical team.  6. Surveillance for postoperative MI with ECG immediately postoperatively and on postoperative days 1 and 2 AND troponin levels 24 hours postoperatively and on day 4 or hospital discharge (whichever comes first): at the discretion of anesthesiologist.  7. Other measures: Preoperative alcohol abstention x 30 days.  Preoperative tobacco abstention x 60 days.  -     CBC auto differential; Future;  Expected date: 06/20/2018  -     Comprehensive metabolic panel; Future; Expected date: 06/20/2018  -     EKG 12-lead; Future  -     X-Ray Chest PA And Lateral; Future; Expected date: 06/20/2018    Essential hypertension  Continue amlodipine and lotensin    Anxiety  Followed by PCP  On ativan    Recurrent UTI  Followed by PCP    Malignant neoplasm of upper-outer quadrant of left breast in female, estrogen receptor positive  Followed by heme/onc    Hypothyroidism due to acquired atrophy of thyroid  Continue synthroid  Followed by PCP    Chronic insomnia  On Ambien  Followed by PCP    Warning signs discussed, patient to call with any further issues or worsening of symptoms.

## 2018-06-21 ENCOUNTER — TELEPHONE (OUTPATIENT)
Dept: FAMILY MEDICINE | Facility: CLINIC | Age: 83
End: 2018-06-21

## 2018-06-21 NOTE — TELEPHONE ENCOUNTER
I spoke with patient and informed her that her paperwork have been faxed.Patient voiced understanding.

## 2018-06-22 ENCOUNTER — TELEPHONE (OUTPATIENT)
Dept: FAMILY MEDICINE | Facility: CLINIC | Age: 83
End: 2018-06-22

## 2018-06-22 NOTE — TELEPHONE ENCOUNTER
I spoke with patient and informed her paperwork is ready for  at the second floor . Patient voiced understanding.

## 2018-06-22 NOTE — TELEPHONE ENCOUNTER
----- Message from Stephanie Israel sent at 6/22/2018  9:26 AM CDT -----  Contact: self / [phone]  Patient is requesting a call back regarding, she wants to  paper work for a physical. She had. She says she don't want to wait. Please advise     She will be here when it is ready to . she ask that you call her when ready to .

## 2018-06-23 DIAGNOSIS — F51.04 CHRONIC INSOMNIA: ICD-10-CM

## 2018-06-25 RX ORDER — ZOLPIDEM TARTRATE 10 MG/1
TABLET ORAL
Qty: 90 TABLET | Refills: 0 | Status: SHIPPED | OUTPATIENT
Start: 2018-06-25 | End: 2018-08-22 | Stop reason: SDUPTHER

## 2018-06-27 ENCOUNTER — TELEPHONE (OUTPATIENT)
Dept: FAMILY MEDICINE | Facility: CLINIC | Age: 83
End: 2018-06-27

## 2018-06-27 NOTE — TELEPHONE ENCOUNTER
----- Message from Geraldine Walker sent at 6/27/2018 12:28 PM CDT -----  Contact: [phone]/self  Patient is requesting a call back regarding Faxing clearance for surgery. Please check the box on the paper work for CLEARANCE. Call Dr. Castro [phone]  fax

## 2018-06-27 NOTE — TELEPHONE ENCOUNTER
I spoke with patient and she state that the pre-op clearance signed. I informed the patient that Dr. Vicente is out of the office today and 6/28/2018 I will get him the signed the paperwork and fax it off. Patient voiced understanding.

## 2018-06-29 ENCOUNTER — TELEPHONE (OUTPATIENT)
Dept: FAMILY MEDICINE | Facility: CLINIC | Age: 83
End: 2018-06-29

## 2018-06-29 DIAGNOSIS — F41.9 ANXIETY: ICD-10-CM

## 2018-06-29 RX ORDER — LORAZEPAM 1 MG/1
TABLET ORAL
Qty: 90 TABLET | Refills: 1 | Status: SHIPPED | OUTPATIENT
Start: 2018-06-29 | End: 2019-01-06 | Stop reason: SDUPTHER

## 2018-06-29 NOTE — TELEPHONE ENCOUNTER
Left msg for CB to inform pt she can, but she has to find out from her insurance regarding coverage and then go to local pharmacy

## 2018-06-29 NOTE — TELEPHONE ENCOUNTER
Informed pt she can, but she has to find out from her insurance regarding coverage and then go to local pharmacy. Pt verbalized understanding.

## 2018-06-29 NOTE — TELEPHONE ENCOUNTER
----- Message from Marcy Armando sent at 6/29/2018  8:31 AM CDT -----  Contact: [phone] self  Patient requested to speak with the nurse about getting the shingles vaccination again. Please call and advise.

## 2018-06-29 NOTE — TELEPHONE ENCOUNTER
----- Message from Kailyn Hernandez sent at 6/29/2018  9:44 AM CDT -----  Contact: [phone]/self  Patient called in returning your call. Please advise.

## 2018-06-29 NOTE — TELEPHONE ENCOUNTER
Yes she can - she has to find out from her insurance regarding coverage and then go to local pharmacy

## 2018-07-02 RX ORDER — HYDROCODONE BITARTRATE AND ACETAMINOPHEN 7.5; 325 MG/1; MG/1
TABLET ORAL
COMMUNITY
Start: 2018-06-14 | End: 2019-02-05

## 2018-07-09 ENCOUNTER — TELEPHONE (OUTPATIENT)
Dept: FAMILY MEDICINE | Facility: CLINIC | Age: 83
End: 2018-07-09

## 2018-07-09 NOTE — TELEPHONE ENCOUNTER
----- Message from Marcy Armando sent at 7/9/2018 12:07 PM CDT -----  Contact: rian with [facility] [phone]  Rep advised the medical clearance form that was faxed over was not signed or checked off. Please call and advise.

## 2018-07-09 NOTE — TELEPHONE ENCOUNTER
I spoke with Tamiko with [facility] clearance for Isabelle Roberts.I re faxed clearance.Please advise.

## 2018-08-20 ENCOUNTER — TELEPHONE (OUTPATIENT)
Dept: FAMILY MEDICINE | Facility: CLINIC | Age: 83
End: 2018-08-20

## 2018-08-20 DIAGNOSIS — I10 ESSENTIAL HYPERTENSION: Primary | ICD-10-CM

## 2018-08-20 DIAGNOSIS — R73.02 GLUCOSE INTOLERANCE (IMPAIRED GLUCOSE TOLERANCE): ICD-10-CM

## 2018-08-20 DIAGNOSIS — E03.4 HYPOTHYROIDISM DUE TO ACQUIRED ATROPHY OF THYROID: ICD-10-CM

## 2018-08-20 NOTE — TELEPHONE ENCOUNTER
----- Message from Rachel Cabrera sent at 8/20/2018  7:43 AM CDT -----  Contact: Self [phone]  Patient is calling to get Lab Orders for her follow up appointment. Please advice

## 2018-08-20 NOTE — TELEPHONE ENCOUNTER
----- Message from Kiarra Agudelo sent at 8/20/2018 11:09 AM CDT -----  Contact: self, [phone]  Patient is calling back to see if orders were placed for her to have labs before her 8/22 appointment. Please advise.

## 2018-08-21 ENCOUNTER — LAB VISIT (OUTPATIENT)
Dept: LAB | Facility: HOSPITAL | Age: 83
End: 2018-08-21
Attending: FAMILY MEDICINE
Payer: MEDICARE

## 2018-08-21 DIAGNOSIS — E03.4 HYPOTHYROIDISM DUE TO ACQUIRED ATROPHY OF THYROID: ICD-10-CM

## 2018-08-21 DIAGNOSIS — R73.02 GLUCOSE INTOLERANCE (IMPAIRED GLUCOSE TOLERANCE): ICD-10-CM

## 2018-08-21 DIAGNOSIS — I10 ESSENTIAL HYPERTENSION: ICD-10-CM

## 2018-08-21 LAB
ALBUMIN SERPL BCP-MCNC: 3.6 G/DL
ALP SERPL-CCNC: 67 U/L
ALT SERPL W/O P-5'-P-CCNC: 19 U/L
ANION GAP SERPL CALC-SCNC: 5 MMOL/L
AST SERPL-CCNC: 19 U/L
BASOPHILS # BLD AUTO: 0.08 K/UL
BASOPHILS NFR BLD: 1.5 %
BILIRUB SERPL-MCNC: 0.5 MG/DL
BUN SERPL-MCNC: 9 MG/DL
CALCIUM SERPL-MCNC: 8.8 MG/DL
CHLORIDE SERPL-SCNC: 107 MMOL/L
CHOLEST SERPL-MCNC: 216 MG/DL
CHOLEST/HDLC SERPL: 3.2 {RATIO}
CO2 SERPL-SCNC: 31 MMOL/L
CREAT SERPL-MCNC: 0.7 MG/DL
DIFFERENTIAL METHOD: ABNORMAL
EOSINOPHIL # BLD AUTO: 0.6 K/UL
EOSINOPHIL NFR BLD: 10 %
ERYTHROCYTE [DISTWIDTH] IN BLOOD BY AUTOMATED COUNT: 12.5 %
EST. GFR  (AFRICAN AMERICAN): >60 ML/MIN/1.73 M^2
EST. GFR  (NON AFRICAN AMERICAN): >60 ML/MIN/1.73 M^2
ESTIMATED AVG GLUCOSE: 123 MG/DL
GLUCOSE SERPL-MCNC: 120 MG/DL
HBA1C MFR BLD HPLC: 5.9 %
HCT VFR BLD AUTO: 42.1 %
HDLC SERPL-MCNC: 67 MG/DL
HDLC SERPL: 31 %
HGB BLD-MCNC: 13.4 G/DL
IMM GRANULOCYTES # BLD AUTO: 0.01 K/UL
IMM GRANULOCYTES NFR BLD AUTO: 0.2 %
LDLC SERPL CALC-MCNC: 133.6 MG/DL
LYMPHOCYTES # BLD AUTO: 1.8 K/UL
LYMPHOCYTES NFR BLD: 32.4 %
MCH RBC QN AUTO: 30.8 PG
MCHC RBC AUTO-ENTMCNC: 31.8 G/DL
MCV RBC AUTO: 97 FL
MONOCYTES # BLD AUTO: 0.7 K/UL
MONOCYTES NFR BLD: 12.9 %
NEUTROPHILS # BLD AUTO: 2.4 K/UL
NEUTROPHILS NFR BLD: 43 %
NONHDLC SERPL-MCNC: 149 MG/DL
NRBC BLD-RTO: 0 /100 WBC
PLATELET # BLD AUTO: 250 K/UL
PMV BLD AUTO: 10.7 FL
POTASSIUM SERPL-SCNC: 4 MMOL/L
PROT SERPL-MCNC: 6.9 G/DL
RBC # BLD AUTO: 4.35 M/UL
SODIUM SERPL-SCNC: 143 MMOL/L
TRIGL SERPL-MCNC: 77 MG/DL
TSH SERPL DL<=0.005 MIU/L-ACNC: 1.73 UIU/ML
WBC # BLD AUTO: 5.5 K/UL

## 2018-08-21 PROCEDURE — 80061 LIPID PANEL: CPT

## 2018-08-21 PROCEDURE — 80053 COMPREHEN METABOLIC PANEL: CPT

## 2018-08-21 PROCEDURE — 83036 HEMOGLOBIN GLYCOSYLATED A1C: CPT

## 2018-08-21 PROCEDURE — 36415 COLL VENOUS BLD VENIPUNCTURE: CPT | Mod: PO

## 2018-08-21 PROCEDURE — 85025 COMPLETE CBC W/AUTO DIFF WBC: CPT

## 2018-08-21 PROCEDURE — 84443 ASSAY THYROID STIM HORMONE: CPT

## 2018-08-22 ENCOUNTER — OFFICE VISIT (OUTPATIENT)
Dept: FAMILY MEDICINE | Facility: CLINIC | Age: 83
End: 2018-08-22
Attending: FAMILY MEDICINE
Payer: MEDICARE

## 2018-08-22 VITALS
BODY MASS INDEX: 22.39 KG/M2 | TEMPERATURE: 99 F | OXYGEN SATURATION: 98 % | WEIGHT: 139.31 LBS | HEART RATE: 70 BPM | HEIGHT: 66 IN | SYSTOLIC BLOOD PRESSURE: 134 MMHG | DIASTOLIC BLOOD PRESSURE: 80 MMHG

## 2018-08-22 DIAGNOSIS — C50.412 MALIGNANT NEOPLASM OF UPPER-OUTER QUADRANT OF LEFT BREAST IN FEMALE, ESTROGEN RECEPTOR POSITIVE: ICD-10-CM

## 2018-08-22 DIAGNOSIS — Z17.0 MALIGNANT NEOPLASM OF UPPER-OUTER QUADRANT OF LEFT BREAST IN FEMALE, ESTROGEN RECEPTOR POSITIVE: ICD-10-CM

## 2018-08-22 DIAGNOSIS — E03.4 HYPOTHYROIDISM DUE TO ACQUIRED ATROPHY OF THYROID: ICD-10-CM

## 2018-08-22 DIAGNOSIS — R73.02 GLUCOSE INTOLERANCE (IMPAIRED GLUCOSE TOLERANCE): ICD-10-CM

## 2018-08-22 DIAGNOSIS — I10 ESSENTIAL HYPERTENSION: Primary | ICD-10-CM

## 2018-08-22 DIAGNOSIS — F41.9 ANXIETY: ICD-10-CM

## 2018-08-22 DIAGNOSIS — F51.04 CHRONIC INSOMNIA: ICD-10-CM

## 2018-08-22 PROCEDURE — 99999 PR PBB SHADOW E&M-EST. PATIENT-LVL IV: CPT | Mod: PBBFAC,,, | Performed by: FAMILY MEDICINE

## 2018-08-22 PROCEDURE — 99214 OFFICE O/P EST MOD 30 MIN: CPT | Mod: S$PBB,,, | Performed by: FAMILY MEDICINE

## 2018-08-22 PROCEDURE — 99214 OFFICE O/P EST MOD 30 MIN: CPT | Mod: PBBFAC,PO | Performed by: FAMILY MEDICINE

## 2018-08-22 RX ORDER — LEVOTHYROXINE SODIUM 75 UG/1
75 TABLET ORAL
Qty: 90 TABLET | Refills: 3 | Status: SHIPPED | OUTPATIENT
Start: 2018-08-22 | End: 2019-10-16 | Stop reason: SDUPTHER

## 2018-08-22 RX ORDER — MIRTAZAPINE 15 MG/1
15 TABLET, FILM COATED ORAL NIGHTLY
Qty: 90 TABLET | Refills: 3 | Status: SHIPPED | OUTPATIENT
Start: 2018-08-22 | End: 2019-10-05 | Stop reason: SDUPTHER

## 2018-08-22 RX ORDER — ZOLPIDEM TARTRATE 10 MG/1
TABLET ORAL
Qty: 90 TABLET | Refills: 3 | Status: SHIPPED | OUTPATIENT
Start: 2018-08-22 | End: 2019-03-24 | Stop reason: SDUPTHER

## 2018-08-22 NOTE — PROGRESS NOTES
Subjective:       Patient ID: Isabelle Roberts is a 84 y.o. female.    Chief Complaint: Follow-up    84 yr old pleasant white female with recurrent/chronic UTI, anxiety, insomnia, HTN, hypothyroidism, breast CA in remission, glaucoma, presents today for her 6 month follow up. She also wants med refill. No new concerns today.    HTN - controlled- on norvasc and lisinopril daily - compliant - no side effects      Hypothyroidism - controlled - normal TSh - on synthroid - no side effects      Anxiety/insomnia - chronic and stable - takes ativan once a day and ambien at night and no side effects      Breast CA in remission - follows oncology      UTI - recurrent - gets infections every few months - want to have antibiotic handy - declines need for urology      History as below - reviewed      Health maintenance  -labs UTD  -mammo UTD  -vaccines UTD          Medication Refill   This is a chronic problem. The current episode started more than 1 year ago. The problem occurs constantly. The problem has been gradually improving. Pertinent negatives include no arthralgias, chest pain, congestion, coughing, diaphoresis, headaches, joint swelling, myalgias, nausea, neck pain, rash, sore throat, visual change, vomiting or weakness. Nothing aggravates the symptoms. Treatments tried: as below. The treatment provided significant relief.   Hypertension   This is a chronic problem. The current episode started more than 1 year ago. The problem has been gradually improving since onset. The problem is controlled. Associated symptoms include anxiety. Pertinent negatives include no chest pain, headaches, malaise/fatigue, neck pain, palpitations, PND, shortness of breath or sweats. There are no associated agents to hypertension. Risk factors for coronary artery disease include post-menopausal state. Past treatments include calcium channel blockers and ACE inhibitors. The current treatment provides significant improvement. There are no  compliance problems.  There is no history of angina, CAD/MI, CVA, left ventricular hypertrophy, PVD or retinopathy. Identifiable causes of hypertension include a thyroid problem. There is no history of chronic renal disease, coarctation of the aorta, a hypertension causing med or sleep apnea.   Thyroid Problem   Presents for follow-up visit. Symptoms include anxiety. Patient reports no cold intolerance, constipation, diaphoresis, diarrhea, heat intolerance, menstrual problem, palpitations, tremors, visual change or weight loss. The symptoms have been stable. Past treatments include levothyroxine. The treatment provided significant relief. Prior procedures include thyroid ultrasound. There is no history of atrial fibrillation, diabetes, Graves' ophthalmopathy, hyperlipidemia, neuropathy or osteopenia. There are no known risk factors.   Anxiety   Presents for follow-up visit. Symptoms include excessive worry and nervous/anxious behavior. Patient reports no chest pain, confusion, decreased concentration, dizziness, nausea, palpitations, shortness of breath or suicidal ideas. Symptoms occur most days. Nothing aggravates the symptoms. The quality of sleep is fair. Nighttime awakenings: occasional.     There are no known risk factors. Her past medical history is significant for anxiety/panic attacks. There is no history of asthma, bipolar disorder, CAD, chronic lung disease, depression, fibromyalgia or suicide attempts. Past treatments include benzodiazephines. The treatment provided significant relief. Compliance with prior treatments has been good. Compliance with medications is %.     Review of Systems   Constitutional: Negative.  Negative for activity change, diaphoresis, malaise/fatigue, unexpected weight change and weight loss.   HENT: Negative.  Negative for congestion, ear discharge, hearing loss, rhinorrhea, sore throat and voice change.    Eyes: Negative.  Negative for pain, discharge and visual  disturbance.   Respiratory: Negative.  Negative for cough, chest tightness, shortness of breath and wheezing.    Cardiovascular: Negative.  Negative for chest pain, palpitations and PND.   Gastrointestinal: Negative.  Negative for abdominal distention, anal bleeding, constipation, diarrhea, nausea and vomiting.   Endocrine: Negative.  Negative for cold intolerance, heat intolerance, polydipsia and polyuria.   Genitourinary: Negative.  Negative for decreased urine volume, difficulty urinating, dysuria, frequency, menstrual problem and vaginal pain.   Musculoskeletal: Negative.  Negative for arthralgias, gait problem, joint swelling, myalgias and neck pain.   Skin: Negative.  Negative for color change, pallor, rash and wound.   Allergic/Immunologic: Negative.  Negative for environmental allergies and immunocompromised state.   Neurological: Negative.  Negative for dizziness, tremors, seizures, speech difficulty, weakness and headaches.   Hematological: Negative.  Negative for adenopathy. Does not bruise/bleed easily.   Psychiatric/Behavioral: Negative for agitation, confusion, decreased concentration, hallucinations, self-injury and suicidal ideas. The patient is nervous/anxious.        PMH/PSH/FH/SH/MED/ALLERGY reviewed    Objective:       Vitals:    08/22/18 1057   BP: 134/80   Pulse: 70   Temp: 99 °F (37.2 °C)       Physical Exam   Constitutional: She is oriented to person, place, and time. She appears well-developed and well-nourished. No distress.   HENT:   Head: Normocephalic and atraumatic.   Right Ear: External ear normal.   Left Ear: External ear normal.   Nose: Nose normal.   Mouth/Throat: Oropharynx is clear and moist. No oropharyngeal exudate.   Eyes: Conjunctivae and EOM are normal. Pupils are equal, round, and reactive to light. Right eye exhibits no discharge. Left eye exhibits no discharge. No scleral icterus.   Neck: Normal range of motion. Neck supple. No JVD present. No tracheal deviation present.  No thyromegaly present.   Cardiovascular: Normal rate, regular rhythm, normal heart sounds and intact distal pulses. Exam reveals no gallop and no friction rub.   No murmur heard.  Pulmonary/Chest: Effort normal and breath sounds normal. No stridor. She has no wheezes. She has no rales. She exhibits no tenderness.   Abdominal: Soft. Bowel sounds are normal. She exhibits no distension and no mass. There is no tenderness. There is no rebound and no guarding. No hernia.   Musculoskeletal: Normal range of motion. She exhibits no edema or tenderness.   Lymphadenopathy:     She has no cervical adenopathy.   Neurological: She is alert and oriented to person, place, and time. She has normal reflexes. She displays normal reflexes. No cranial nerve deficit. She exhibits normal muscle tone. Coordination normal.   Skin: Skin is warm and dry. No rash noted. She is not diaphoretic. No erythema. No pallor.   Psychiatric: She has a normal mood and affect. Her behavior is normal. Judgment and thought content normal.       Assessment:       1. Essential hypertension    2. Glucose intolerance (impaired glucose tolerance)    3. Hypothyroidism due to acquired atrophy of thyroid    4. Anxiety    5. Chronic insomnia    6. Malignant neoplasm of upper-outer quadrant of left breast in female, estrogen receptor positive        Plan:       Isabelle was seen today for follow-up.    Diagnoses and all orders for this visit:    Essential hypertension    Glucose intolerance (impaired glucose tolerance)    Hypothyroidism due to acquired atrophy of thyroid  -     SYNTHROID 75 mcg tablet; Take 1 tablet (75 mcg total) by mouth before breakfast.    Anxiety    Chronic insomnia  -     mirtazapine (REMERON) 15 MG tablet; Take 1 tablet (15 mg total) by mouth nightly.  -     zolpidem (AMBIEN) 10 mg Tab; TAKE ONE TABLET BY MOUTH IN THE EVENING AS NEEDED    Malignant neoplasm of upper-outer quadrant of left breast in female, estrogen receptor  positive      HTN  -controlled  -refilled meds  -labs    Hypothyroidism  -controlled      Chronic insomnia  Ambien 10 prn  -sleep hygiene    Left breast CA  -in remission  -follows oncology    Anxiety  -controlled  -continue REMERON  -ativan prn. Side effects of medications have been discussed and patient agreed to proceed with treatment and understands the risks and benefits.    Spent adequate time in obtaining history and explaining differentials    40 minutes spent during this visit of which greater than 50% devoted to face-face counseling and coordination of care regarding diagnosis and management plan    Follow-up in about 6 months (around 2/22/2019), or if symptoms worsen or fail to improve.

## 2018-08-22 NOTE — LETTER
August 22, 2018    Isabelle Roberts  [address]  [place] [address]             [address]  [place] [address]  Phone: [phone]  Fax: [phone] Dear Isabelle Roberts        Please find enclosed copies of your results. If you have any questions or concerns, please don't hesitate to call.    Lab Visit on 08/21/2018   Component Date Value Ref Range Status    WBC 08/21/2018 5.50  3.90 - 12.70 K/uL Final    RBC 08/21/2018 4.35  4.00 - 5.40 M/uL Final    Hemoglobin 08/21/2018 13.4  12.0 - 16.0 g/dL Final    Hematocrit 08/21/2018 42.1  37.0 - 48.5 % Final    MCV 08/21/2018 97  82 - 98 fL Final    MCH 08/21/2018 30.8  27.0 - 31.0 pg Final    MCHC 08/21/2018 31.8* 32.0 - 36.0 g/dL Final    RDW 08/21/2018 12.5  11.5 - 14.5 % Final    Platelets 08/21/2018 250  150 - 350 K/uL Final    MPV 08/21/2018 10.7  9.2 - 12.9 fL Final    Immature Granulocytes 08/21/2018 0.2  0.0 - 0.5 % Final    Gran # (ANC) 08/21/2018 2.4  1.8 - 7.7 K/uL Final    Immature Grans (Abs) 08/21/2018 0.01  0.00 - 0.04 K/uL Final    Comment: Mild elevation in immature granulocytes is non specific and   can be seen in a variety of conditions including stress response,   acute inflammation, trauma and pregnancy. Correlation with other   laboratory and clinical findings is essential.      Lymph # 08/21/2018 1.8  1.0 - 4.8 K/uL Final    Mono # 08/21/2018 0.7  0.3 - 1.0 K/uL Final    Eos # 08/21/2018 0.6* 0.0 - 0.5 K/uL Final    Baso # 08/21/2018 0.08  0.00 - 0.20 K/uL Final    nRBC 08/21/2018 0  0 /100 WBC Final    Gran% 08/21/2018 43.0  38.0 - 73.0 % Final    Lymph% 08/21/2018 32.4  18.0 - 48.0 % Final    Mono% 08/21/2018 12.9  4.0 - 15.0 % Final    Eosinophil% 08/21/2018 10.0* 0.0 - 8.0 % Final    Basophil% 08/21/2018 1.5  0.0 - 1.9 % Final    Differential Method 08/21/2018 Automated   Final    Sodium 08/21/2018 143  136 - 145 mmol/L Final    Potassium 08/21/2018 4.0  3.5 - 5.1  mmol/L Final    Chloride 08/21/2018 107  95 - 110 mmol/L Final    CO2 08/21/2018 31* 23 - 29 mmol/L Final    Glucose 08/21/2018 120* 70 - 110 mg/dL Final    BUN, Bld 08/21/2018 9  8 - 23 mg/dL Final    Creatinine 08/21/2018 0.7  0.5 - 1.4 mg/dL Final    Calcium 08/21/2018 8.8  8.7 - 10.5 mg/dL Final    Total Protein 08/21/2018 6.9  6.0 - 8.4 g/dL Final    Albumin 08/21/2018 3.6  3.5 - 5.2 g/dL Final    Total Bilirubin 08/21/2018 0.5  0.1 - 1.0 mg/dL Final    Comment: For infants and newborns, interpretation of results should be based  on gestational age, weight and in agreement with clinical  observations.  Premature Infant recommended reference ranges:  Up to 24 hours.............<8.0 mg/dL  Up to 48 hours............<12.0 mg/dL  3-5 days..................<15.0 mg/dL  6-29 days.................<15.0 mg/dL      Alkaline Phosphatase 08/21/2018 67  55 - 135 U/L Final    AST 08/21/2018 19  10 - 40 U/L Final    ALT 08/21/2018 19  10 - 44 U/L Final    Anion Gap 08/21/2018 5* 8 - 16 mmol/L Final    eGFR if African American 08/21/2018 >60.0  >60 mL/min/1.73 m^2 Final    eGFR if non African American 08/21/2018 >60.0  >60 mL/min/1.73 m^2 Final    Comment: Calculation used to obtain the estimated glomerular filtration  rate (eGFR) is the CKD-EPI equation.       Cholesterol 08/21/2018 216* 120 - 199 mg/dL Final    Comment: The National Cholesterol Education Program (NCEP) has set the  following guidelines (reference ranges) for Cholesterol:  Optimal.....................<200 mg/dL  Borderline High.............200-239 mg/dL  High........................> or = 240 mg/dL      Triglycerides 08/21/2018 77  30 - 150 mg/dL Final    Comment: The National Cholesterol Education Program (NCEP) has set the  following guidelines (reference values) for triglycerides:  Normal......................<150 mg/dL  Borderline High.............150-199 mg/dL  High........................200-499 mg/dL      HDL 08/21/2018 67  40 - 75  mg/dL Final    Comment: The National Cholesterol Education Program (NCEP) has set the  following guidelines (reference values) for HDL Cholesterol:  Low...............<40 mg/dL  Optimal...........>60 mg/dL      LDL Cholesterol 08/21/2018 133.6  63.0 - 159.0 mg/dL Final    Comment: The National Cholesterol Education Program (NCEP) has set the  following guidelines (reference values) for LDL Cholesterol:  Optimal.......................<130 mg/dL  Borderline High...............130-159 mg/dL  High..........................160-189 mg/dL  Very High.....................>190 mg/dL      HDL/Chol Ratio 08/21/2018 31.0  20.0 - 50.0 % Final    Total Cholesterol/HDL Ratio 08/21/2018 3.2  2.0 - 5.0 Final    Non-HDL Cholesterol 08/21/2018 149  mg/dL Final    Comment: Risk category and Non-HDL cholesterol goals:  Coronary heart disease (CHD)or equivalent (10-year risk of CHD >20%):  Non-HDL cholesterol goal     <130 mg/dL  Two or more CHD risk factors and 10-year risk of CHD <= 20%:  Non-HDL cholesterol goal     <160 mg/dL  0 to 1 CHD risk factor:  Non-HDL cholesterol goal     <190 mg/dL      Hemoglobin A1C 08/21/2018 5.9* 4.0 - 5.6 % Final    Comment: ADA Screening Guidelines:  5.7-6.4%  Consistent with prediabetes  >or=6.5%  Consistent with diabetes  High levels of fetal hemoglobin interfere with the HbA1C  assay. Heterozygous hemoglobin variants (HbS, HgC, etc)do  not significantly interfere with this assay.   However, presence of multiple variants may affect accuracy.      Estimated Avg Glucose 08/21/2018 123  68 - 131 mg/dL Final    TSH 08/21/2018 1.728  0.400 - 4.000 uIU/mL Final         Sincerely,        Devin Bernal MD

## 2018-09-05 ENCOUNTER — TELEPHONE (OUTPATIENT)
Dept: HEMATOLOGY/ONCOLOGY | Facility: CLINIC | Age: 83
End: 2018-09-05

## 2018-09-11 ENCOUNTER — TELEPHONE (OUTPATIENT)
Dept: HEMATOLOGY/ONCOLOGY | Facility: CLINIC | Age: 83
End: 2018-09-11

## 2018-09-20 ENCOUNTER — IMMUNIZATION (OUTPATIENT)
Dept: FAMILY MEDICINE | Facility: CLINIC | Age: 83
End: 2018-09-20
Payer: MEDICARE

## 2018-09-20 DIAGNOSIS — Z23 FLU VACCINE NEED: Primary | ICD-10-CM

## 2018-09-20 PROCEDURE — 90662 IIV NO PRSV INCREASED AG IM: CPT | Mod: PBBFAC,PO

## 2018-09-24 ENCOUNTER — TELEPHONE (OUTPATIENT)
Dept: HEMATOLOGY/ONCOLOGY | Facility: CLINIC | Age: 83
End: 2018-09-24

## 2018-09-24 NOTE — TELEPHONE ENCOUNTER
"Spoke to pt. Regarding recall appt. For mammo. Pt. Refuses mammo appt. At this time stating, " I don't want to schedule the mammogram because I'm not sure that I'll do anything about it if they find something. I still want to see Dr. Hernandez on Oct. 3 and if I change my mind about the mammo I'll call to schedule at a later date."    "

## 2018-10-02 RX ORDER — ZOSTER VACCINE RECOMBINANT, ADJUVANTED 50 MCG/0.5
KIT INTRAMUSCULAR
COMMUNITY
Start: 2018-06-29 | End: 2019-02-05

## 2018-10-03 ENCOUNTER — OFFICE VISIT (OUTPATIENT)
Dept: HEMATOLOGY/ONCOLOGY | Facility: CLINIC | Age: 83
End: 2018-10-03
Payer: MEDICARE

## 2018-10-03 VITALS
RESPIRATION RATE: 16 BRPM | BODY MASS INDEX: 22.17 KG/M2 | HEART RATE: 70 BPM | SYSTOLIC BLOOD PRESSURE: 116 MMHG | TEMPERATURE: 98 F | WEIGHT: 137.38 LBS | DIASTOLIC BLOOD PRESSURE: 67 MMHG | OXYGEN SATURATION: 98 %

## 2018-10-03 DIAGNOSIS — F41.9 ANXIETY: ICD-10-CM

## 2018-10-03 DIAGNOSIS — Z85.3 HISTORY OF BREAST CANCER: Primary | ICD-10-CM

## 2018-10-03 PROCEDURE — 99213 OFFICE O/P EST LOW 20 MIN: CPT | Mod: S$PBB,,, | Performed by: INTERNAL MEDICINE

## 2018-10-03 PROCEDURE — 99213 OFFICE O/P EST LOW 20 MIN: CPT | Mod: PBBFAC,PO | Performed by: INTERNAL MEDICINE

## 2018-10-03 PROCEDURE — 99999 PR PBB SHADOW E&M-EST. PATIENT-LVL III: CPT | Mod: PBBFAC,,, | Performed by: INTERNAL MEDICINE

## 2018-10-03 RX ORDER — PREDNISOLONE ACETATE 10 MG/ML
SUSPENSION/ DROPS OPHTHALMIC
COMMUNITY
Start: 2018-09-25 | End: 2019-02-05

## 2018-10-03 RX ORDER — ERYTHROMYCIN 5 MG/G
OINTMENT OPHTHALMIC
COMMUNITY
Start: 2018-08-13 | End: 2020-09-11

## 2018-10-03 RX ORDER — OFLOXACIN 3 MG/ML
SOLUTION/ DROPS OPHTHALMIC
COMMUNITY
Start: 2018-08-27 | End: 2019-02-05

## 2018-10-03 RX ORDER — BACITRACIN 500 [USP'U]/G
OINTMENT OPHTHALMIC
COMMUNITY
Start: 2018-08-27 | End: 2019-02-05

## 2018-10-03 NOTE — PROGRESS NOTES
Subjective:       Patient ID: Isabelle Roberts is a 84 y.o. female.    Chief Complaint: Hx BRCA (follow up )    HPI   She has a history of Stage IA (T1aN0) infiltrating mammary carcinoma of the left breast diagnosed in 04/2008, for which a left-sided partial mastectomy followed by radiation therapy with MammoSite was performed in 05/2008.     She had an ER/TN positive and Her-2/china negative tumor and hence was started on Arimidex in 06/2008, switched to Femara and then to Tamoxifen due to side-effects - which was eventually dc'd in July 2012.    She is here for a followup today.   She has hot flashes and uses premarin cream on a prn basis.      - her PCP.    Review of Systems    CONSTITUTIONAL: No weight loss or fevers.  HEME/LYMPH: No lymph node enlargements or bruises  CARDIOVASCULAR: No chest pain or palpitations.  RESPIRATORY: No cough, hemoptysis or dyspnea.  GASTROINTESTINAL: No abdominal pain, nausea or change in bowel habits.    Objective:      Physical Exam    GENERAL: Well-groomed, moderately-built. Vitals noted.  ENT&MOUTH: Oral mucosa moist. No thrush, gum bleeding or oral masses noted.  NECK: Supple without any masses. Thyroid is non-tender.  BREASTS: Did not reveal any masses, lesions or nipple inversion in either breast. There is no axillary lymphadenopathy. There is an old healed lumpectomy scar in the left breast in the 2 o'clock position and some chronic thickening around the left nipple area.  LYMPH NODES: None felt in the neck or supraclavicular areas.  LUNGS: Clear bilaterally without crackles or wheeze. Breathing nonlabored.  HEART: S1, S2 heard. Rhythm regular. No tachycardia or gallops. No pedal edema.   ABDOMEN: Soft and non-tender. No palpable masses, hepatosplenomegaly or ascites.    Assessment:       1. History of breast cancer    2. Anxiety        Plan:   She is doing well from a breast cancer standpoint with no evidence of disease.   Continue premarin cream prn.   Physical exam is  unremarkable.    She is not interested in pursuing further mammograms. I think that's ok.  Will see her yearly and order labs as needed.

## 2018-10-11 ENCOUNTER — TELEPHONE (OUTPATIENT)
Dept: FAMILY MEDICINE | Facility: CLINIC | Age: 83
End: 2018-10-11

## 2018-10-11 NOTE — TELEPHONE ENCOUNTER
----- Message from Stephanie Israel sent at 10/11/2018  9:33 AM CDT -----  Contact: self /  [phone]  Patient is requesting a call back regarding, she would like to get the new pneumonia shot  Vaccination, and needs to know if you have it available? Please advise

## 2019-01-06 DIAGNOSIS — F41.9 ANXIETY: ICD-10-CM

## 2019-01-09 RX ORDER — LORAZEPAM 1 MG/1
TABLET ORAL
Qty: 90 TABLET | Refills: 1 | Status: SHIPPED | OUTPATIENT
Start: 2019-01-09 | End: 2019-07-15 | Stop reason: SDUPTHER

## 2019-01-28 ENCOUNTER — TELEPHONE (OUTPATIENT)
Dept: FAMILY MEDICINE | Facility: CLINIC | Age: 84
End: 2019-01-28

## 2019-01-28 DIAGNOSIS — R73.02 GLUCOSE INTOLERANCE (IMPAIRED GLUCOSE TOLERANCE): ICD-10-CM

## 2019-01-28 DIAGNOSIS — E03.4 HYPOTHYROIDISM DUE TO ACQUIRED ATROPHY OF THYROID: ICD-10-CM

## 2019-01-28 DIAGNOSIS — I10 ESSENTIAL HYPERTENSION: Primary | ICD-10-CM

## 2019-01-28 NOTE — TELEPHONE ENCOUNTER
----- Message from Rachel Cabrera sent at 1/28/2019 10:10 AM CST -----  Contact: Self [phone]  Patient is requesting Lab orders for her 6 month follow up.

## 2019-02-01 ENCOUNTER — LAB VISIT (OUTPATIENT)
Dept: LAB | Facility: HOSPITAL | Age: 84
End: 2019-02-01
Attending: FAMILY MEDICINE
Payer: MEDICARE

## 2019-02-01 DIAGNOSIS — E03.4 HYPOTHYROIDISM DUE TO ACQUIRED ATROPHY OF THYROID: ICD-10-CM

## 2019-02-01 DIAGNOSIS — R73.02 GLUCOSE INTOLERANCE (IMPAIRED GLUCOSE TOLERANCE): ICD-10-CM

## 2019-02-01 DIAGNOSIS — I10 ESSENTIAL HYPERTENSION: ICD-10-CM

## 2019-02-01 LAB
ALBUMIN SERPL BCP-MCNC: 3.6 G/DL
ALP SERPL-CCNC: 62 U/L
ALT SERPL W/O P-5'-P-CCNC: 16 U/L
ANION GAP SERPL CALC-SCNC: 8 MMOL/L
AST SERPL-CCNC: 17 U/L
BASOPHILS # BLD AUTO: 0.08 K/UL
BASOPHILS NFR BLD: 1.3 %
BILIRUB SERPL-MCNC: 0.7 MG/DL
BUN SERPL-MCNC: 9 MG/DL
CALCIUM SERPL-MCNC: 9.1 MG/DL
CHLORIDE SERPL-SCNC: 108 MMOL/L
CHOLEST SERPL-MCNC: 213 MG/DL
CHOLEST/HDLC SERPL: 2.9 {RATIO}
CO2 SERPL-SCNC: 27 MMOL/L
CREAT SERPL-MCNC: 0.8 MG/DL
DIFFERENTIAL METHOD: ABNORMAL
EOSINOPHIL # BLD AUTO: 0.5 K/UL
EOSINOPHIL NFR BLD: 8.5 %
ERYTHROCYTE [DISTWIDTH] IN BLOOD BY AUTOMATED COUNT: 12.6 %
EST. GFR  (AFRICAN AMERICAN): >60 ML/MIN/1.73 M^2
EST. GFR  (NON AFRICAN AMERICAN): >60 ML/MIN/1.73 M^2
ESTIMATED AVG GLUCOSE: 131 MG/DL
GLUCOSE SERPL-MCNC: 139 MG/DL
HBA1C MFR BLD HPLC: 6.2 %
HCT VFR BLD AUTO: 42.2 %
HDLC SERPL-MCNC: 73 MG/DL
HDLC SERPL: 34.3 %
HGB BLD-MCNC: 13.5 G/DL
IMM GRANULOCYTES # BLD AUTO: 0.01 K/UL
IMM GRANULOCYTES NFR BLD AUTO: 0.2 %
LDLC SERPL CALC-MCNC: 123.6 MG/DL
LYMPHOCYTES # BLD AUTO: 1.9 K/UL
LYMPHOCYTES NFR BLD: 31.9 %
MCH RBC QN AUTO: 31.2 PG
MCHC RBC AUTO-ENTMCNC: 32 G/DL
MCV RBC AUTO: 98 FL
MONOCYTES # BLD AUTO: 0.7 K/UL
MONOCYTES NFR BLD: 11.9 %
NEUTROPHILS # BLD AUTO: 2.8 K/UL
NEUTROPHILS NFR BLD: 46.2 %
NONHDLC SERPL-MCNC: 140 MG/DL
NRBC BLD-RTO: 0 /100 WBC
PLATELET # BLD AUTO: 271 K/UL
PMV BLD AUTO: 10.6 FL
POTASSIUM SERPL-SCNC: 3.8 MMOL/L
PROT SERPL-MCNC: 7 G/DL
RBC # BLD AUTO: 4.33 M/UL
SODIUM SERPL-SCNC: 143 MMOL/L
TRIGL SERPL-MCNC: 82 MG/DL
TSH SERPL DL<=0.005 MIU/L-ACNC: 1.95 UIU/ML
WBC # BLD AUTO: 5.99 K/UL

## 2019-02-01 PROCEDURE — 83036 HEMOGLOBIN GLYCOSYLATED A1C: CPT

## 2019-02-01 PROCEDURE — 85025 COMPLETE CBC W/AUTO DIFF WBC: CPT

## 2019-02-01 PROCEDURE — 36415 COLL VENOUS BLD VENIPUNCTURE: CPT | Mod: PO

## 2019-02-01 PROCEDURE — 84443 ASSAY THYROID STIM HORMONE: CPT

## 2019-02-01 PROCEDURE — 80053 COMPREHEN METABOLIC PANEL: CPT

## 2019-02-01 PROCEDURE — 80061 LIPID PANEL: CPT

## 2019-02-04 ENCOUNTER — TELEPHONE (OUTPATIENT)
Dept: FAMILY MEDICINE | Facility: CLINIC | Age: 84
End: 2019-02-04

## 2019-02-04 NOTE — TELEPHONE ENCOUNTER
Spoke with pt she is scheduled for tomorrow to review labs. She wants to discuss frequent urination issues.

## 2019-02-04 NOTE — TELEPHONE ENCOUNTER
----- Message from Kiarra Jammie sent at 2/4/2019 10:19 AM CST -----  Contact: self, [phone]  Patient feels she needs to be seen today and wants to know if you can accommodate her . States she had blood work done on 2/1. Please advise.

## 2019-02-05 ENCOUNTER — OFFICE VISIT (OUTPATIENT)
Dept: FAMILY MEDICINE | Facility: CLINIC | Age: 84
End: 2019-02-05
Attending: FAMILY MEDICINE
Payer: MEDICARE

## 2019-02-05 ENCOUNTER — LAB VISIT (OUTPATIENT)
Dept: LAB | Facility: HOSPITAL | Age: 84
End: 2019-02-05
Attending: FAMILY MEDICINE
Payer: MEDICARE

## 2019-02-05 VITALS
WEIGHT: 138.44 LBS | SYSTOLIC BLOOD PRESSURE: 139 MMHG | OXYGEN SATURATION: 97 % | HEART RATE: 70 BPM | DIASTOLIC BLOOD PRESSURE: 75 MMHG | HEIGHT: 65 IN | TEMPERATURE: 98 F | BODY MASS INDEX: 23.07 KG/M2

## 2019-02-05 DIAGNOSIS — N39.0 RECURRENT UTI: ICD-10-CM

## 2019-02-05 DIAGNOSIS — J31.0 CHRONIC RHINITIS: ICD-10-CM

## 2019-02-05 DIAGNOSIS — F41.9 ANXIETY: ICD-10-CM

## 2019-02-05 DIAGNOSIS — Z17.0 MALIGNANT NEOPLASM OF UPPER-OUTER QUADRANT OF LEFT BREAST IN FEMALE, ESTROGEN RECEPTOR POSITIVE: ICD-10-CM

## 2019-02-05 DIAGNOSIS — E03.4 HYPOTHYROIDISM DUE TO ACQUIRED ATROPHY OF THYROID: ICD-10-CM

## 2019-02-05 DIAGNOSIS — N39.0 RECURRENT UTI: Primary | ICD-10-CM

## 2019-02-05 DIAGNOSIS — R73.02 GLUCOSE INTOLERANCE (IMPAIRED GLUCOSE TOLERANCE): ICD-10-CM

## 2019-02-05 DIAGNOSIS — C50.412 MALIGNANT NEOPLASM OF UPPER-OUTER QUADRANT OF LEFT BREAST IN FEMALE, ESTROGEN RECEPTOR POSITIVE: ICD-10-CM

## 2019-02-05 LAB
BACTERIA #/AREA URNS AUTO: ABNORMAL /HPF
BILIRUB SERPL-MCNC: NEGATIVE MG/DL
BILIRUB UR QL STRIP: NEGATIVE
BLOOD URINE, POC: NEGATIVE
CLARITY UR REFRACT.AUTO: ABNORMAL
COLOR UR AUTO: YELLOW
COLOR, POC UA: ABNORMAL
GLUCOSE UR QL STRIP: NEGATIVE
GLUCOSE UR QL STRIP: NEGATIVE
HGB UR QL STRIP: NEGATIVE
KETONES UR QL STRIP: NEGATIVE
KETONES UR QL STRIP: NEGATIVE
LEUKOCYTE ESTERASE UR QL STRIP: ABNORMAL
LEUKOCYTE ESTERASE URINE, POC: ABNORMAL
MICROSCOPIC COMMENT: ABNORMAL
NITRITE UR QL STRIP: POSITIVE
NITRITE, POC UA: POSITIVE
PH UR STRIP: 8 [PH] (ref 5–8)
PH, POC UA: 7.5
PROT UR QL STRIP: NEGATIVE
PROTEIN, POC: ABNORMAL
RBC #/AREA URNS AUTO: 1 /HPF (ref 0–4)
SP GR UR STRIP: 1.01 (ref 1–1.03)
SPECIFIC GRAVITY, POC UA: 1.01
SQUAMOUS #/AREA URNS AUTO: 16 /HPF
URN SPEC COLLECT METH UR: ABNORMAL
UROBILINOGEN, POC UA: ABNORMAL
WBC #/AREA URNS AUTO: 10 /HPF (ref 0–5)

## 2019-02-05 PROCEDURE — 87086 URINE CULTURE/COLONY COUNT: CPT

## 2019-02-05 PROCEDURE — 81001 URINALYSIS AUTO W/SCOPE: CPT

## 2019-02-05 PROCEDURE — 99214 PR OFFICE/OUTPT VISIT, EST, LEVL IV, 30-39 MIN: ICD-10-PCS | Mod: 25,S$PBB,, | Performed by: FAMILY MEDICINE

## 2019-02-05 PROCEDURE — 81002 URINALYSIS NONAUTO W/O SCOPE: CPT | Mod: PBBFAC,PO | Performed by: FAMILY MEDICINE

## 2019-02-05 PROCEDURE — 99999 PR PBB SHADOW E&M-EST. PATIENT-LVL III: CPT | Mod: PBBFAC,,, | Performed by: FAMILY MEDICINE

## 2019-02-05 PROCEDURE — 87088 URINE BACTERIA CULTURE: CPT

## 2019-02-05 PROCEDURE — 99214 OFFICE O/P EST MOD 30 MIN: CPT | Mod: 25,S$PBB,, | Performed by: FAMILY MEDICINE

## 2019-02-05 PROCEDURE — 99213 OFFICE O/P EST LOW 20 MIN: CPT | Mod: PBBFAC,PO | Performed by: FAMILY MEDICINE

## 2019-02-05 PROCEDURE — 99999 PR PBB SHADOW E&M-EST. PATIENT-LVL III: ICD-10-PCS | Mod: PBBFAC,,, | Performed by: FAMILY MEDICINE

## 2019-02-05 RX ORDER — NITROFURANTOIN 25; 75 MG/1; MG/1
100 CAPSULE ORAL 2 TIMES DAILY
Qty: 20 CAPSULE | Refills: 0 | Status: SHIPPED | OUTPATIENT
Start: 2019-02-05 | End: 2019-08-05

## 2019-02-05 NOTE — PROGRESS NOTES
Subjective:       Patient ID: Isabelle Roberts is a 85 y.o. female.    Chief Complaint: Polyuria (tdap)    84 yr old pleasant white female with recurrent/chronic UTI, anxiety, insomnia, HTN, hypothyroidism, breast CA in remission, glaucoma, presents today for her 6 month follow up. She c/o dysuria and polyuria. She gets frequent UTI.      HTN - controlled- on norvasc and lisinopril daily - compliant - no side effects      Hypothyroidism - controlled - normal TSh - on synthroid - no side effects      Anxiety/insomnia - chronic and stable - takes ativan once a day and ambien at night and no side effects      Breast CA in remission - follows oncology      UTI - recurrent - gets infections every few months - want to have antibiotic handy - declines need for urology      History as below - reviewed      Health maintenance  -labs UTD  -mammo UTD  -vaccines UTD          Medication Refill   This is a chronic problem. The current episode started more than 1 year ago. The problem occurs constantly. The problem has been gradually improving. Pertinent negatives include no arthralgias, chest pain, congestion, coughing, diaphoresis, headaches, joint swelling, myalgias, nausea, neck pain, rash, sore throat, visual change, vomiting or weakness. Nothing aggravates the symptoms. Treatments tried: as below. The treatment provided significant relief.   Hypertension   This is a chronic problem. The current episode started more than 1 year ago. The problem has been gradually improving since onset. The problem is controlled. Associated symptoms include anxiety. Pertinent negatives include no chest pain, headaches, malaise/fatigue, neck pain, palpitations, PND, shortness of breath or sweats. There are no associated agents to hypertension. Risk factors for coronary artery disease include post-menopausal state. Past treatments include calcium channel blockers and ACE inhibitors. The current treatment provides significant improvement. There are  no compliance problems.  There is no history of angina, CAD/MI, CVA, left ventricular hypertrophy, PVD or retinopathy. Identifiable causes of hypertension include a thyroid problem. There is no history of chronic renal disease, coarctation of the aorta, a hypertension causing med or sleep apnea.   Thyroid Problem   Presents for follow-up visit. Symptoms include anxiety. Patient reports no cold intolerance, constipation, diaphoresis, diarrhea, heat intolerance, menstrual problem, palpitations, tremors, visual change or weight loss. The symptoms have been stable. Past treatments include levothyroxine. The treatment provided significant relief. Prior procedures include thyroid ultrasound. There is no history of atrial fibrillation, diabetes, Graves' ophthalmopathy, hyperlipidemia, neuropathy or osteopenia. There are no known risk factors.   Anxiety   Presents for follow-up visit. Symptoms include excessive worry and nervous/anxious behavior. Patient reports no chest pain, confusion, decreased concentration, dizziness, nausea, palpitations, shortness of breath or suicidal ideas. Symptoms occur most days. Nothing aggravates the symptoms. The quality of sleep is fair. Nighttime awakenings: occasional.     There are no known risk factors. Her past medical history is significant for anxiety/panic attacks. There is no history of asthma, bipolar disorder, CAD, chronic lung disease, depression, fibromyalgia or suicide attempts. Past treatments include benzodiazephines. The treatment provided significant relief. Compliance with prior treatments has been good. Compliance with medications is %.   Dysuria    This is a recurrent problem. The current episode started 1 to 4 weeks ago. The problem occurs every urination. The problem has been unchanged. The quality of the pain is described as burning. The pain is at a severity of 3/10. The pain is mild. There has been no fever. She is not sexually active. There is no history of  pyelonephritis. Associated symptoms include frequency. Pertinent negatives include no nausea, sweats, vomiting, weight loss, constipation or rash. She has tried antibiotics for the symptoms. The treatment provided no relief. Her past medical history is significant for hypertension.     Review of Systems   Constitutional: Negative.  Negative for activity change, diaphoresis, malaise/fatigue, unexpected weight change and weight loss.   HENT: Negative.  Negative for congestion, ear discharge, hearing loss, rhinorrhea, sore throat and voice change.    Eyes: Negative.  Negative for pain, discharge and visual disturbance.   Respiratory: Negative.  Negative for cough, chest tightness, shortness of breath and wheezing.    Cardiovascular: Negative.  Negative for chest pain, palpitations and PND.   Gastrointestinal: Negative.  Negative for abdominal distention, anal bleeding, constipation, diarrhea, nausea and vomiting.   Endocrine: Negative.  Negative for cold intolerance, heat intolerance, polydipsia and polyuria.   Genitourinary: Positive for dysuria and frequency. Negative for decreased urine volume, difficulty urinating, menstrual problem and vaginal pain.   Musculoskeletal: Negative.  Negative for arthralgias, gait problem, joint swelling, myalgias and neck pain.   Skin: Negative.  Negative for color change, pallor, rash and wound.   Allergic/Immunologic: Negative.  Negative for environmental allergies and immunocompromised state.   Neurological: Negative.  Negative for dizziness, tremors, seizures, speech difficulty, weakness and headaches.   Hematological: Negative.  Negative for adenopathy. Does not bruise/bleed easily.   Psychiatric/Behavioral: Negative for agitation, confusion, decreased concentration, hallucinations, self-injury and suicidal ideas. The patient is nervous/anxious.        PMH/PSH/FH/SH/MED/ALLERGY reviewed    Objective:       Vitals:    02/05/19 0906   BP: 139/75   Pulse: 70   Temp: 97.8 °F (36.6  °C)       Physical Exam   Constitutional: She is oriented to person, place, and time. She appears well-developed and well-nourished. No distress.   HENT:   Head: Normocephalic and atraumatic.   Right Ear: External ear normal.   Left Ear: External ear normal.   Nose: Nose normal.   Mouth/Throat: Oropharynx is clear and moist. No oropharyngeal exudate.   Eyes: Conjunctivae and EOM are normal. Pupils are equal, round, and reactive to light. Right eye exhibits no discharge. Left eye exhibits no discharge. No scleral icterus.   Neck: Normal range of motion. Neck supple. No JVD present. No tracheal deviation present. No thyromegaly present.   Cardiovascular: Normal rate, regular rhythm, normal heart sounds and intact distal pulses. Exam reveals no gallop and no friction rub.   No murmur heard.  Pulmonary/Chest: Effort normal and breath sounds normal. No stridor. She has no wheezes. She has no rales. She exhibits no tenderness.   Abdominal: Soft. Bowel sounds are normal. She exhibits no distension and no mass. There is no tenderness. There is no rebound and no guarding. No hernia.   Musculoskeletal: Normal range of motion. She exhibits no edema or tenderness.   Lymphadenopathy:     She has no cervical adenopathy.   Neurological: She is alert and oriented to person, place, and time. She has normal reflexes. She displays normal reflexes. No cranial nerve deficit. She exhibits normal muscle tone. Coordination normal.   Skin: Skin is warm and dry. No rash noted. She is not diaphoretic. No erythema. No pallor.   Psychiatric: She has a normal mood and affect. Her behavior is normal. Judgment and thought content normal.       Assessment:       1. Recurrent UTI    2. Malignant neoplasm of upper-outer quadrant of left breast in female, estrogen receptor positive    3. Anxiety    4. Chronic rhinitis    5. Hypothyroidism due to acquired atrophy of thyroid    6. Glucose intolerance (impaired glucose tolerance)        Plan:          Isabelle was seen today for polyuria.    Diagnoses and all orders for this visit:    Recurrent UTI  -     Urinalysis; Future  -     Urine culture; Future  -     POCT URINE DIPSTICK WITHOUT MICROSCOPE  -     nitrofurantoin, macrocrystal-monohydrate, (MACROBID) 100 MG capsule; Take 1 capsule (100 mg total) by mouth 2 (two) times daily.    Malignant neoplasm of upper-outer quadrant of left breast in female, estrogen receptor positive    Anxiety    Chronic rhinitis    Hypothyroidism due to acquired atrophy of thyroid    Glucose intolerance (impaired glucose tolerance)       recurrent UTI  -dip test - +nitrite   -start macrobid x 10 days  -UA n culture  -fluids    HTN  -controlled  -refilled meds  -labs    Hypothyroidism  -controlled      Chronic insomnia  Ambien 10 prn  -sleep hygiene    Left breast CA  -in remission  -follows oncology    Anxiety  -controlled  -continue REMERON  -ativan prn. Side effects of medications have been discussed and patient agreed to proceed with treatment and understands the risks and benefits.    Spent adequate time in obtaining history and explaining differentials    40 minutes spent during this visit of which greater than 50% devoted to face-face counseling and coordination of care regarding diagnosis and management plan      Follow-up in about 6 months (around 8/5/2019), or if symptoms worsen or fail to improve.

## 2019-02-06 ENCOUNTER — TELEPHONE (OUTPATIENT)
Dept: FAMILY MEDICINE | Facility: CLINIC | Age: 84
End: 2019-02-06

## 2019-02-06 NOTE — TELEPHONE ENCOUNTER
----- Message from Luis Robb sent at 2/6/2019  1:03 PM CST -----  Contact: [phone]/self  Patient requesting to speak with you concerning her test results.  Please call and advise

## 2019-02-06 NOTE — TELEPHONE ENCOUNTER
Spoke to pt and she was notified the results were not ready yet and we would call her once they were ready. Pt understood and will await a call back.

## 2019-02-07 ENCOUNTER — PES CALL (OUTPATIENT)
Dept: ADMINISTRATIVE | Facility: CLINIC | Age: 84
End: 2019-02-07

## 2019-02-07 ENCOUNTER — TELEPHONE (OUTPATIENT)
Dept: FAMILY MEDICINE | Facility: CLINIC | Age: 84
End: 2019-02-07

## 2019-02-07 LAB — BACTERIA UR CULT: NORMAL

## 2019-02-11 ENCOUNTER — TELEPHONE (OUTPATIENT)
Dept: FAMILY MEDICINE | Facility: CLINIC | Age: 84
End: 2019-02-11

## 2019-02-11 NOTE — TELEPHONE ENCOUNTER
----- Message from Luis Robb sent at 2/11/2019  8:50 AM CST -----  Contact: [phone]/self  Patient requesting to speak with you concerning her test results.  Please call and advise

## 2019-02-12 ENCOUNTER — TELEPHONE (OUTPATIENT)
Dept: FAMILY MEDICINE | Facility: CLINIC | Age: 84
End: 2019-02-12

## 2019-02-12 NOTE — TELEPHONE ENCOUNTER
----- Message from Marcy Armando sent at 2/12/2019 12:53 PM CST -----  Contact: [phone]  Patient would like to get test results. Please call.

## 2019-02-12 NOTE — TELEPHONE ENCOUNTER
"Called patient to notify of lab results.  Patient states that symptoms seems to be going away.  Patient states that yesterday was a good day and today she feels much better.  Advised patient to continue antibiotics.  Patient insisted on being prescribed another antibiotic "for just in case."  States that she still has another day or so of current antibiotic.  Advised patient to call back if symptoms worsen.    "

## 2019-02-12 NOTE — TELEPHONE ENCOUNTER
The culture came back as staph infection - even though the medicine sometimes work and sometimes not - I asked my nurse to call her and ask how she is doing coz if she still has symptoms - we can change antibiotic

## 2019-02-13 ENCOUNTER — TELEPHONE (OUTPATIENT)
Dept: FAMILY MEDICINE | Facility: CLINIC | Age: 84
End: 2019-02-13

## 2019-02-13 RX ORDER — DOXYCYCLINE 100 MG/1
100 CAPSULE ORAL 2 TIMES DAILY
Qty: 20 CAPSULE | Refills: 0 | Status: SHIPPED | OUTPATIENT
Start: 2019-02-13 | End: 2019-08-05

## 2019-03-23 DIAGNOSIS — I10 ESSENTIAL HYPERTENSION: ICD-10-CM

## 2019-03-24 DIAGNOSIS — F51.04 CHRONIC INSOMNIA: ICD-10-CM

## 2019-03-25 RX ORDER — ZOLPIDEM TARTRATE 10 MG/1
TABLET ORAL
Qty: 90 TABLET | Refills: 0 | Status: SHIPPED | OUTPATIENT
Start: 2019-03-25 | End: 2019-06-04 | Stop reason: SDUPTHER

## 2019-03-25 RX ORDER — AMLODIPINE BESYLATE 10 MG/1
TABLET ORAL
Qty: 90 TABLET | Refills: 3 | Status: SHIPPED | OUTPATIENT
Start: 2019-03-25 | End: 2020-02-05 | Stop reason: SDUPTHER

## 2019-04-12 ENCOUNTER — PES CALL (OUTPATIENT)
Dept: ADMINISTRATIVE | Facility: CLINIC | Age: 84
End: 2019-04-12

## 2019-05-19 DIAGNOSIS — I10 ESSENTIAL HYPERTENSION: ICD-10-CM

## 2019-05-20 RX ORDER — BENAZEPRIL HYDROCHLORIDE 40 MG/1
TABLET ORAL
Qty: 90 TABLET | Refills: 3 | Status: SHIPPED | OUTPATIENT
Start: 2019-05-20 | End: 2020-02-05 | Stop reason: SDUPTHER

## 2019-06-04 DIAGNOSIS — F51.04 CHRONIC INSOMNIA: ICD-10-CM

## 2019-06-05 NOTE — TELEPHONE ENCOUNTER
----- Message from Janine Aguilar sent at 6/5/2019  9:28 AM CDT -----  No. [phone]   Patient needs a script for generic Ambien.   [facility]

## 2019-06-05 NOTE — TELEPHONE ENCOUNTER
Spoke to pt and informed her that she is not due for a refill until 6/25/19. Informed pt that she would have to call  to her refill date.

## 2019-06-11 RX ORDER — ZOLPIDEM TARTRATE 10 MG/1
TABLET ORAL
Qty: 30 TABLET | Refills: 0 | Status: SHIPPED | OUTPATIENT
Start: 2019-06-11 | End: 2019-06-25 | Stop reason: SDUPTHER

## 2019-06-25 DIAGNOSIS — F51.04 CHRONIC INSOMNIA: ICD-10-CM

## 2019-06-25 RX ORDER — ZOLPIDEM TARTRATE 10 MG/1
TABLET ORAL
Qty: 30 TABLET | Refills: 0 | Status: SHIPPED | OUTPATIENT
Start: 2019-06-25 | End: 2019-08-05 | Stop reason: SDUPTHER

## 2019-06-25 NOTE — TELEPHONE ENCOUNTER
----- Message from Kailyn Hernandez sent at 6/25/2019  8:49 AM CDT -----  Contact: [phone]/self  Patient requesting to speak with you regarding getting a 90 day supply of medication zolpidem (AMBIEN) 10 mg Tab. Please advise.

## 2019-07-15 DIAGNOSIS — F41.9 ANXIETY: ICD-10-CM

## 2019-07-15 RX ORDER — LORAZEPAM 1 MG/1
TABLET ORAL
Qty: 90 TABLET | Refills: 1 | Status: SHIPPED | OUTPATIENT
Start: 2019-07-15 | End: 2019-07-16 | Stop reason: SDUPTHER

## 2019-07-16 DIAGNOSIS — F41.9 ANXIETY: ICD-10-CM

## 2019-07-16 RX ORDER — LORAZEPAM 1 MG/1
TABLET ORAL
Qty: 90 TABLET | Refills: 1 | Status: SHIPPED | OUTPATIENT
Start: 2019-07-16 | End: 2019-07-16 | Stop reason: SDUPTHER

## 2019-07-16 RX ORDER — LORAZEPAM 1 MG/1
1 TABLET ORAL NIGHTLY
Qty: 90 TABLET | Refills: 0 | Status: SHIPPED | OUTPATIENT
Start: 2019-07-16 | End: 2019-10-01 | Stop reason: SDUPTHER

## 2019-07-16 NOTE — TELEPHONE ENCOUNTER
----- Message from Radha Shen sent at 7/16/2019  9:08 AM CDT -----  Contact: self  Type:  RX Refill Request    Who Called: Patient   Refill or New Rx refill  RX Name and Strength:Lorazapan  How is the patient currently taking it? (ex. 1XDay)  Is this a 30 day or 90 day RX: 90 days  Preferred Pharmacy with phone number:[facility] [phone]  Local or Mail Order:  Ordering Provider: Dr Bernal  Would the patient rather a call back or a response via [facility]? NA  Best Call Back Number:  [phone]  Additional Information:

## 2019-07-30 ENCOUNTER — TELEPHONE (OUTPATIENT)
Dept: HEMATOLOGY/ONCOLOGY | Facility: CLINIC | Age: 84
End: 2019-07-30

## 2019-07-30 ENCOUNTER — TELEPHONE (OUTPATIENT)
Dept: FAMILY MEDICINE | Facility: CLINIC | Age: 84
End: 2019-07-30

## 2019-07-30 DIAGNOSIS — E03.4 HYPOTHYROIDISM DUE TO ACQUIRED ATROPHY OF THYROID: ICD-10-CM

## 2019-07-30 DIAGNOSIS — N39.0 RECURRENT UTI: ICD-10-CM

## 2019-07-30 DIAGNOSIS — R73.02 GLUCOSE INTOLERANCE (IMPAIRED GLUCOSE TOLERANCE): ICD-10-CM

## 2019-07-30 DIAGNOSIS — I10 ESSENTIAL HYPERTENSION: Primary | ICD-10-CM

## 2019-07-30 NOTE — TELEPHONE ENCOUNTER
Message sent to Dr Bernal staff for lab orders  ----- Message from Eileen Beard sent at 7/30/2019  9:53 AM CDT -----  Contact: [phone]-self  Patient requesting lab orders be placed in system. Please call.

## 2019-07-30 NOTE — TELEPHONE ENCOUNTER
----- Message from Eileen Beard sent at 7/30/2019  9:53 AM CDT -----  Contact: [phone]-self  Patient requesting lab orders be placed in system. Please call.

## 2019-08-02 ENCOUNTER — PES CALL (OUTPATIENT)
Dept: ADMINISTRATIVE | Facility: CLINIC | Age: 84
End: 2019-08-02

## 2019-08-02 ENCOUNTER — LAB VISIT (OUTPATIENT)
Dept: LAB | Facility: HOSPITAL | Age: 84
End: 2019-08-02
Attending: INTERNAL MEDICINE
Payer: MEDICARE

## 2019-08-02 DIAGNOSIS — I10 ESSENTIAL HYPERTENSION: ICD-10-CM

## 2019-08-02 DIAGNOSIS — E03.4 HYPOTHYROIDISM DUE TO ACQUIRED ATROPHY OF THYROID: ICD-10-CM

## 2019-08-02 DIAGNOSIS — R73.02 GLUCOSE INTOLERANCE (IMPAIRED GLUCOSE TOLERANCE): ICD-10-CM

## 2019-08-02 DIAGNOSIS — C50.912 MALIGNANT NEOPLASM OF LEFT BREAST: ICD-10-CM

## 2019-08-02 LAB
ALBUMIN SERPL BCP-MCNC: 3.9 G/DL (ref 3.5–5.2)
ALBUMIN SERPL BCP-MCNC: 3.9 G/DL (ref 3.5–5.2)
ALP SERPL-CCNC: 65 U/L (ref 55–135)
ALP SERPL-CCNC: 65 U/L (ref 55–135)
ALT SERPL W/O P-5'-P-CCNC: 17 U/L (ref 10–44)
ALT SERPL W/O P-5'-P-CCNC: 17 U/L (ref 10–44)
ANION GAP SERPL CALC-SCNC: 6 MMOL/L (ref 8–16)
ANION GAP SERPL CALC-SCNC: 6 MMOL/L (ref 8–16)
AST SERPL-CCNC: 18 U/L (ref 10–40)
AST SERPL-CCNC: 18 U/L (ref 10–40)
BASOPHILS # BLD AUTO: 0.09 K/UL (ref 0–0.2)
BASOPHILS # BLD AUTO: 0.09 K/UL (ref 0–0.2)
BASOPHILS NFR BLD: 1.5 % (ref 0–1.9)
BASOPHILS NFR BLD: 1.5 % (ref 0–1.9)
BILIRUB SERPL-MCNC: 0.6 MG/DL (ref 0.1–1)
BILIRUB SERPL-MCNC: 0.6 MG/DL (ref 0.1–1)
BUN SERPL-MCNC: 10 MG/DL (ref 8–23)
BUN SERPL-MCNC: 10 MG/DL (ref 8–23)
CALCIUM SERPL-MCNC: 9 MG/DL (ref 8.7–10.5)
CALCIUM SERPL-MCNC: 9 MG/DL (ref 8.7–10.5)
CHLORIDE SERPL-SCNC: 103 MMOL/L (ref 95–110)
CHLORIDE SERPL-SCNC: 103 MMOL/L (ref 95–110)
CO2 SERPL-SCNC: 30 MMOL/L (ref 23–29)
CO2 SERPL-SCNC: 30 MMOL/L (ref 23–29)
CREAT SERPL-MCNC: 0.7 MG/DL (ref 0.5–1.4)
CREAT SERPL-MCNC: 0.7 MG/DL (ref 0.5–1.4)
DIFFERENTIAL METHOD: ABNORMAL
DIFFERENTIAL METHOD: ABNORMAL
EOSINOPHIL # BLD AUTO: 0.5 K/UL (ref 0–0.5)
EOSINOPHIL # BLD AUTO: 0.5 K/UL (ref 0–0.5)
EOSINOPHIL NFR BLD: 7.6 % (ref 0–8)
EOSINOPHIL NFR BLD: 7.6 % (ref 0–8)
ERYTHROCYTE [DISTWIDTH] IN BLOOD BY AUTOMATED COUNT: 12.3 % (ref 11.5–14.5)
ERYTHROCYTE [DISTWIDTH] IN BLOOD BY AUTOMATED COUNT: 12.3 % (ref 11.5–14.5)
EST. GFR  (AFRICAN AMERICAN): >60 ML/MIN/1.73 M^2
EST. GFR  (AFRICAN AMERICAN): >60 ML/MIN/1.73 M^2
EST. GFR  (NON AFRICAN AMERICAN): >60 ML/MIN/1.73 M^2
EST. GFR  (NON AFRICAN AMERICAN): >60 ML/MIN/1.73 M^2
ESTIMATED AVG GLUCOSE: 123 MG/DL (ref 68–131)
GLUCOSE SERPL-MCNC: 119 MG/DL (ref 70–110)
GLUCOSE SERPL-MCNC: 119 MG/DL (ref 70–110)
HBA1C MFR BLD HPLC: 5.9 % (ref 4–5.6)
HCT VFR BLD AUTO: 43.6 % (ref 37–48.5)
HCT VFR BLD AUTO: 43.6 % (ref 37–48.5)
HGB BLD-MCNC: 13.8 G/DL (ref 12–16)
HGB BLD-MCNC: 13.8 G/DL (ref 12–16)
IMM GRANULOCYTES # BLD AUTO: 0.01 K/UL (ref 0–0.04)
IMM GRANULOCYTES # BLD AUTO: 0.01 K/UL (ref 0–0.04)
IMM GRANULOCYTES NFR BLD AUTO: 0.2 % (ref 0–0.5)
IMM GRANULOCYTES NFR BLD AUTO: 0.2 % (ref 0–0.5)
LYMPHOCYTES # BLD AUTO: 2 K/UL (ref 1–4.8)
LYMPHOCYTES # BLD AUTO: 2 K/UL (ref 1–4.8)
LYMPHOCYTES NFR BLD: 33.2 % (ref 18–48)
LYMPHOCYTES NFR BLD: 33.2 % (ref 18–48)
MCH RBC QN AUTO: 30.7 PG (ref 27–31)
MCH RBC QN AUTO: 30.7 PG (ref 27–31)
MCHC RBC AUTO-ENTMCNC: 31.7 G/DL (ref 32–36)
MCHC RBC AUTO-ENTMCNC: 31.7 G/DL (ref 32–36)
MCV RBC AUTO: 97 FL (ref 82–98)
MCV RBC AUTO: 97 FL (ref 82–98)
MONOCYTES # BLD AUTO: 0.8 K/UL (ref 0.3–1)
MONOCYTES # BLD AUTO: 0.8 K/UL (ref 0.3–1)
MONOCYTES NFR BLD: 12.2 % (ref 4–15)
MONOCYTES NFR BLD: 12.2 % (ref 4–15)
NEUTROPHILS # BLD AUTO: 2.8 K/UL (ref 1.8–7.7)
NEUTROPHILS # BLD AUTO: 2.8 K/UL (ref 1.8–7.7)
NEUTROPHILS NFR BLD: 45.3 % (ref 38–73)
NEUTROPHILS NFR BLD: 45.3 % (ref 38–73)
NRBC BLD-RTO: 0 /100 WBC
NRBC BLD-RTO: 0 /100 WBC
PLATELET # BLD AUTO: 300 K/UL (ref 150–350)
PLATELET # BLD AUTO: 300 K/UL (ref 150–350)
PMV BLD AUTO: 10.6 FL (ref 9.2–12.9)
PMV BLD AUTO: 10.6 FL (ref 9.2–12.9)
POTASSIUM SERPL-SCNC: 4 MMOL/L (ref 3.5–5.1)
POTASSIUM SERPL-SCNC: 4 MMOL/L (ref 3.5–5.1)
PROT SERPL-MCNC: 7.3 G/DL (ref 6–8.4)
PROT SERPL-MCNC: 7.3 G/DL (ref 6–8.4)
RBC # BLD AUTO: 4.49 M/UL (ref 4–5.4)
RBC # BLD AUTO: 4.49 M/UL (ref 4–5.4)
SODIUM SERPL-SCNC: 139 MMOL/L (ref 136–145)
SODIUM SERPL-SCNC: 139 MMOL/L (ref 136–145)
TSH SERPL DL<=0.005 MIU/L-ACNC: 0.51 UIU/ML (ref 0.4–4)
WBC # BLD AUTO: 6.15 K/UL (ref 3.9–12.7)
WBC # BLD AUTO: 6.15 K/UL (ref 3.9–12.7)

## 2019-08-02 PROCEDURE — 85025 COMPLETE CBC W/AUTO DIFF WBC: CPT

## 2019-08-02 PROCEDURE — 83036 HEMOGLOBIN GLYCOSYLATED A1C: CPT

## 2019-08-02 PROCEDURE — 36415 COLL VENOUS BLD VENIPUNCTURE: CPT | Mod: PO

## 2019-08-02 PROCEDURE — 80053 COMPREHEN METABOLIC PANEL: CPT

## 2019-08-02 PROCEDURE — 84443 ASSAY THYROID STIM HORMONE: CPT

## 2019-08-05 ENCOUNTER — OFFICE VISIT (OUTPATIENT)
Dept: FAMILY MEDICINE | Facility: CLINIC | Age: 84
End: 2019-08-05
Attending: FAMILY MEDICINE
Payer: MEDICARE

## 2019-08-05 VITALS
WEIGHT: 131.63 LBS | OXYGEN SATURATION: 99 % | SYSTOLIC BLOOD PRESSURE: 118 MMHG | BODY MASS INDEX: 21.93 KG/M2 | HEIGHT: 65 IN | DIASTOLIC BLOOD PRESSURE: 68 MMHG | HEART RATE: 81 BPM

## 2019-08-05 DIAGNOSIS — R73.02 GLUCOSE INTOLERANCE (IMPAIRED GLUCOSE TOLERANCE): ICD-10-CM

## 2019-08-05 DIAGNOSIS — F51.04 CHRONIC INSOMNIA: ICD-10-CM

## 2019-08-05 DIAGNOSIS — F41.9 ANXIETY: ICD-10-CM

## 2019-08-05 DIAGNOSIS — Z17.0 MALIGNANT NEOPLASM OF UPPER-OUTER QUADRANT OF LEFT BREAST IN FEMALE, ESTROGEN RECEPTOR POSITIVE: ICD-10-CM

## 2019-08-05 DIAGNOSIS — I10 ESSENTIAL HYPERTENSION: ICD-10-CM

## 2019-08-05 DIAGNOSIS — C50.412 MALIGNANT NEOPLASM OF UPPER-OUTER QUADRANT OF LEFT BREAST IN FEMALE, ESTROGEN RECEPTOR POSITIVE: ICD-10-CM

## 2019-08-05 DIAGNOSIS — Z00.00 ROUTINE GENERAL MEDICAL EXAMINATION AT A HEALTH CARE FACILITY: Primary | ICD-10-CM

## 2019-08-05 DIAGNOSIS — E03.4 HYPOTHYROIDISM DUE TO ACQUIRED ATROPHY OF THYROID: ICD-10-CM

## 2019-08-05 PROCEDURE — 99397 PER PM REEVAL EST PAT 65+ YR: CPT | Mod: S$PBB,,, | Performed by: FAMILY MEDICINE

## 2019-08-05 PROCEDURE — 99213 OFFICE O/P EST LOW 20 MIN: CPT | Mod: PBBFAC,PO | Performed by: FAMILY MEDICINE

## 2019-08-05 PROCEDURE — 99397 PR PREVENTIVE VISIT,EST,65 & OVER: ICD-10-PCS | Mod: S$PBB,,, | Performed by: FAMILY MEDICINE

## 2019-08-05 PROCEDURE — 99999 PR PBB SHADOW E&M-EST. PATIENT-LVL III: ICD-10-PCS | Mod: PBBFAC,,, | Performed by: FAMILY MEDICINE

## 2019-08-05 PROCEDURE — 99999 PR PBB SHADOW E&M-EST. PATIENT-LVL III: CPT | Mod: PBBFAC,,, | Performed by: FAMILY MEDICINE

## 2019-08-05 RX ORDER — ZOLPIDEM TARTRATE 10 MG/1
TABLET ORAL
Qty: 90 TABLET | Refills: 3 | Status: SHIPPED | OUTPATIENT
Start: 2019-08-05 | End: 2020-02-05 | Stop reason: SDUPTHER

## 2019-08-05 NOTE — PROGRESS NOTES
Subjective:       Patient ID: Isabelle Roberts is a 85 y.o. female.    Chief Complaint: Annual Exam and Results (labs)    85 yr old pleasant white female with recurrent/chronic UTI, anxiety, insomnia, HTN, hypothyroidism, breast CA in remission, glaucoma, presents today for her 6 month follow up and annual wellness check, lab work review. No complaints today.    HTN - controlled- on norvasc and lisinopril daily - compliant - no side effects      Hypothyroidism - controlled - normal TSh - on synthroid - no side effects      Anxiety/insomnia - chronic and stable - takes ativan once a day and ambien at night and no side effects      Breast CA in remission - follows oncology      UTI - recurrent - gets infections every few months - want to have antibiotic handy - declines need for urology      History as below - reviewed      Health maintenance  -labs UTD  -mammo UTD  -vaccines UTD        Medication Refill   This is a chronic problem. The current episode started more than 1 year ago. The problem occurs constantly. The problem has been gradually improving. Pertinent negatives include no arthralgias, chest pain, congestion, coughing, diaphoresis, headaches, joint swelling, myalgias, nausea, neck pain, sore throat, visual change or weakness. Nothing aggravates the symptoms. Treatments tried: as below. The treatment provided significant relief.   Hypertension   This is a chronic problem. The current episode started more than 1 year ago. The problem has been gradually improving since onset. The problem is controlled. Associated symptoms include anxiety. Pertinent negatives include no chest pain, headaches, malaise/fatigue, neck pain, palpitations, PND or shortness of breath. There are no associated agents to hypertension. Risk factors for coronary artery disease include post-menopausal state. Past treatments include calcium channel blockers and ACE inhibitors. The current treatment provides significant improvement. There are no  compliance problems.  There is no history of angina, CAD/MI, CVA, left ventricular hypertrophy, PVD or retinopathy. Identifiable causes of hypertension include a thyroid problem. There is no history of chronic renal disease, coarctation of the aorta, a hypertension causing med or sleep apnea.   Thyroid Problem   Presents for follow-up visit. Symptoms include anxiety. Patient reports no cold intolerance, constipation, diaphoresis, diarrhea, heat intolerance, menstrual problem, palpitations, tremors or visual change. The symptoms have been stable. Past treatments include levothyroxine. The treatment provided significant relief. Prior procedures include thyroid ultrasound. There is no history of atrial fibrillation, diabetes, Graves' ophthalmopathy, hyperlipidemia, neuropathy or osteopenia. There are no known risk factors.   Anxiety   Presents for follow-up visit. Symptoms include excessive worry and nervous/anxious behavior. Patient reports no chest pain, confusion, decreased concentration, dizziness, nausea, palpitations, shortness of breath or suicidal ideas. Symptoms occur most days. Nothing aggravates the symptoms. The quality of sleep is fair. Nighttime awakenings: occasional.     There are no known risk factors. Her past medical history is significant for anxiety/panic attacks. There is no history of asthma, bipolar disorder, CAD, chronic lung disease, depression, fibromyalgia or suicide attempts. Past treatments include benzodiazephines. The treatment provided significant relief. Compliance with prior treatments has been good. Compliance with medications is %.     Review of Systems   Constitutional: Negative.  Negative for activity change, diaphoresis, malaise/fatigue and unexpected weight change.   HENT: Negative.  Negative for congestion, ear discharge, hearing loss, rhinorrhea, sore throat and voice change.    Eyes: Negative.  Negative for pain, discharge and visual disturbance.   Respiratory:  Negative.  Negative for cough, chest tightness, shortness of breath and wheezing.    Cardiovascular: Negative.  Negative for chest pain, palpitations and PND.   Gastrointestinal: Negative.  Negative for abdominal distention, anal bleeding, constipation, diarrhea and nausea.   Endocrine: Negative.  Negative for cold intolerance, heat intolerance, polydipsia and polyuria.   Genitourinary: Negative.  Negative for decreased urine volume, difficulty urinating, dysuria, frequency, menstrual problem and vaginal pain.   Musculoskeletal: Negative.  Negative for arthralgias, gait problem, joint swelling, myalgias and neck pain.   Skin: Negative.  Negative for color change, pallor and wound.   Allergic/Immunologic: Negative.  Negative for environmental allergies and immunocompromised state.   Neurological: Negative.  Negative for dizziness, tremors, seizures, speech difficulty, weakness and headaches.   Hematological: Negative.  Negative for adenopathy. Does not bruise/bleed easily.   Psychiatric/Behavioral: Negative for agitation, confusion, decreased concentration, hallucinations, self-injury and suicidal ideas. The patient is nervous/anxious.        Active Ambulatory Problems     Diagnosis Date Noted    Malignant neoplasm of upper-outer quadrant of left breast in female, estrogen receptor positive 10/01/2012    Hot flashes due to surgical menopause 10/01/2012    Essential hypertension 01/29/2013    Chronic insomnia 01/29/2013    Glaucoma (increased eye pressure) 01/29/2013    Hypothyroidism 01/29/2013    Chronic rhinitis 03/07/2014    Glaucoma 07/16/2015    Allergic rhinitis 07/16/2015    History of breast cancer 07/16/2015    Anxiety 02/25/2016    Glucose intolerance (impaired glucose tolerance) 03/27/2017    Recurrent UTI 04/11/2018     Resolved Ambulatory Problems     Diagnosis Date Noted    Other abnormal glucose 01/29/2013     Past Medical History:   Diagnosis Date    Allergy     Arthritis     Breast  cancer     Cancer     Glaucoma     Glucose intolerance (impaired glucose tolerance)     Hyperlipidemia     Hypertension     Insomnia     Raynaud's disease     Recurrent cystitis     Thyroid disease      Past Surgical History:   Procedure Laterality Date    APPENDECTOMY      BREAST LUMPECTOMY      HYSTERECTOMY       Family History   Problem Relation Age of Onset    Asthma Son     Allergies Son     Allergic rhinitis Son     Asthma Grandchild     Allergies Grandchild     Allergic rhinitis Grandchild     Breast cancer Paternal Cousin     Angioedema Neg Hx     Eczema Neg Hx     Immunodeficiency Neg Hx      Social History     Socioeconomic History    Marital status:      Spouse name: Not on file    Number of children: Not on file    Years of education: Not on file    Highest education level: Not on file   Occupational History     Employer: Retired   Social Needs    Financial resource strain: Not on file    Food insecurity:     Worry: Not on file     Inability: Not on file    Transportation needs:     Medical: Not on file     Non-medical: Not on file   Tobacco Use    Smoking status: Former Smoker     Packs/day: 0.50     Years: 10.00     Pack years: 5.00     Types: Cigarettes     Last attempt to quit:      Years since quittin.6    Smokeless tobacco: Never Used   Substance and Sexual Activity    Alcohol use: Yes    Drug use: No    Sexual activity: Not on file   Lifestyle    Physical activity:     Days per week: Not on file     Minutes per session: Not on file    Stress: Not on file   Relationships    Social connections:     Talks on phone: Not on file     Gets together: Not on file     Attends Judaism service: Not on file     Active member of club or organization: Not on file     Attends meetings of clubs or organizations: Not on file     Relationship status: Not on file   Other Topics Concern    Not on file   Social History Narrative    Not on file     Review of  patient's allergies indicates:   Allergen Reactions    Sunscreen Other (See Comments)     redness    Sulfa (sulfonamide antibiotics) Rash     Current Outpatient Medications on File Prior to Visit   Medication Sig Dispense Refill    amLODIPine (NORVASC) 10 MG tablet TAKE 1 TABLET BY MOUTH ONCE DAILY 90 tablet 3    benazepril (LOTENSIN) 40 MG tablet TAKE 1 TABLET BY MOUTH ONCE DAILY 90 tablet 3    dorzolamide-timolol 2-0.5% (COSOPT) 22.3-6.8 mg/mL ophthalmic solution Place 1 drop into both eyes 2 (two) times daily.       erythromycin (ROMYCIN) ophthalmic ointment       latanoprost 0.005 % ophthalmic solution       LORazepam (ATIVAN) 1 MG tablet Take 1 tablet (1 mg total) by mouth every evening. 90 tablet 0    LUMIGAN 0.01 % Drop Place 1 drop into the right eye every evening.       mirtazapine (REMERON) 15 MG tablet Take 1 tablet (15 mg total) by mouth nightly. 90 tablet 3    SYNTHROID 75 mcg tablet Take 1 tablet (75 mcg total) by mouth before breakfast. 90 tablet 3    tretinoin (RETIN-A) 0.05 % cream APPLY ON FACE TOPICALLY AT BEDTIME TILL ACNE CLEARS 20 g 3    [DISCONTINUED] zolpidem (AMBIEN) 10 mg Tab TAKE 1 TABLET BY MOUTH ONCE DAILY IN THE EVENING AS NEEDED 30 tablet 0    [DISCONTINUED] doxycycline (MONODOX) 100 MG capsule Take 1 capsule (100 mg total) by mouth 2 (two) times daily. 20 capsule 0    [DISCONTINUED] nitrofurantoin, macrocrystal-monohydrate, (MACROBID) 100 MG capsule Take 1 capsule (100 mg total) by mouth 2 (two) times daily. 20 capsule 0     No current facility-administered medications on file prior to visit.        Objective:       Vitals:    08/05/19 1014   BP: 118/68   Pulse: 81       Physical Exam   Constitutional: She is oriented to person, place, and time. She appears well-developed and well-nourished. No distress.   HENT:   Head: Normocephalic and atraumatic.   Right Ear: External ear normal.   Left Ear: External ear normal.   Nose: Nose normal.   Mouth/Throat: Oropharynx is  clear and moist. No oropharyngeal exudate.   Eyes: Pupils are equal, round, and reactive to light. Conjunctivae and EOM are normal. Right eye exhibits no discharge. Left eye exhibits no discharge. No scleral icterus.   Neck: Normal range of motion. Neck supple. No JVD present. No tracheal deviation present. No thyromegaly present.   Cardiovascular: Normal rate, regular rhythm, normal heart sounds and intact distal pulses. Exam reveals no gallop and no friction rub.   No murmur heard.  Pulmonary/Chest: Effort normal and breath sounds normal. No stridor. She has no wheezes. She has no rales. She exhibits no tenderness.   Abdominal: Soft. Bowel sounds are normal. She exhibits no distension and no mass. There is no tenderness. There is no rebound and no guarding. No hernia.   Musculoskeletal: Normal range of motion. She exhibits no edema or tenderness.   Lymphadenopathy:     She has no cervical adenopathy.   Neurological: She is alert and oriented to person, place, and time. She has normal reflexes. She displays normal reflexes. No cranial nerve deficit. She exhibits normal muscle tone. Coordination normal.   Skin: Skin is warm and dry. No rash noted. She is not diaphoretic. No erythema. No pallor.   Psychiatric: She has a normal mood and affect. Her behavior is normal. Judgment and thought content normal.       Assessment:       1. Routine general medical examination at a health care facility    2. Chronic insomnia    3. Malignant neoplasm of upper-outer quadrant of left breast in female, estrogen receptor positive    4. Hypothyroidism due to acquired atrophy of thyroid    5. Glucose intolerance (impaired glucose tolerance)    6. Essential hypertension    7. Anxiety        Plan:           Isabelle was seen today for annual exam and results.    Diagnoses and all orders for this visit:    Routine general medical examination at a health care facility    Chronic insomnia  -     zolpidem (AMBIEN) 10 mg Tab; TAKE 1 TABLET BY  MOUTH ONCE DAILY IN THE EVENING AS NEEDED    Malignant neoplasm of upper-outer quadrant of left breast in female, estrogen receptor positive    Hypothyroidism due to acquired atrophy of thyroid    Glucose intolerance (impaired glucose tolerance)    Essential hypertension    Anxiety      Wellness check  -normal exam  -labs UTD      HTN  -controlled  -refilled meds  -labs    Hypothyroidism  -controlled      Chronic insomnia  Ambien 10 prn  -sleep hygiene    Left breast CA  -in remission  -follows oncology    Anxiety  -controlled  -continue REMERON  -ativan prn. Side effects of medications have been discussed and patient agreed to proceed with treatment and understands the risks and benefits.    Spent adequate time in obtaining history and explaining differentials    40 minutes spent during this visit of which greater than 50% devoted to face-face counseling and coordination of care regarding diagnosis and management plan      Follow up in about 6 months (around 2/5/2020), or if symptoms worsen or fail to improve.

## 2019-08-07 ENCOUNTER — TELEPHONE (OUTPATIENT)
Dept: FAMILY MEDICINE | Facility: CLINIC | Age: 84
End: 2019-08-07

## 2019-08-07 NOTE — TELEPHONE ENCOUNTER
----- Message from Urban Shin MA sent at 8/7/2019 12:17 PM CDT -----  Contact: Pt  Pt would like to be called back regarding she would like a copy of her blood work.      Pt can be reached at  [phone].      Thanks

## 2019-08-09 DIAGNOSIS — L70.9 ACNE, UNSPECIFIED ACNE TYPE: ICD-10-CM

## 2019-08-09 NOTE — TELEPHONE ENCOUNTER
----- Message from Natalieamanda Easley sent at 8/9/2019 11:01 AM CDT -----  Contact: Patient/ [phone]  Type:  RX Refill Request    Who Called: Patient  Refill or New Rx: Refill  RX Name and Strength: tretinoin (RETIN-A) 0.05 % cream  How is the patient currently taking it? (ex. 1XDay): APPLY ON FACE TOPICALLY AT BEDTIME TILL ACNE CLEARS  Is this a 30 day or 90 day RX: 20 g  Preferred Pharmacy with phone number: [phone] [id number] - [address]  Local or Mail Order: Local  Ordering Provider: Dr. Bernal  Would the patient rather a call back or a response via [facility]?  Callback  Best Call Back Number: [phone]  Additional Information:  None

## 2019-08-10 DIAGNOSIS — L70.9 ACNE, UNSPECIFIED ACNE TYPE: ICD-10-CM

## 2019-08-12 RX ORDER — TRETINOIN 0.5 MG/G
CREAM TOPICAL
Qty: 20 G | Refills: 0 | Status: SHIPPED | OUTPATIENT
Start: 2019-08-12 | End: 2020-09-11

## 2019-08-12 RX ORDER — TRETINOIN 0.5 MG/G
CREAM TOPICAL
Qty: 45 G | Refills: 3 | Status: SHIPPED | OUTPATIENT
Start: 2019-08-12 | End: 2020-09-11

## 2019-09-16 ENCOUNTER — TELEPHONE (OUTPATIENT)
Dept: FAMILY MEDICINE | Facility: CLINIC | Age: 84
End: 2019-09-16

## 2019-09-16 ENCOUNTER — OFFICE VISIT (OUTPATIENT)
Dept: FAMILY MEDICINE | Facility: CLINIC | Age: 84
End: 2019-09-16
Payer: MEDICARE

## 2019-09-16 VITALS
TEMPERATURE: 98 F | BODY MASS INDEX: 21.96 KG/M2 | DIASTOLIC BLOOD PRESSURE: 70 MMHG | OXYGEN SATURATION: 97 % | HEIGHT: 65 IN | SYSTOLIC BLOOD PRESSURE: 108 MMHG | WEIGHT: 131.81 LBS | HEART RATE: 83 BPM

## 2019-09-16 DIAGNOSIS — T14.8XXA ABRASION: ICD-10-CM

## 2019-09-16 DIAGNOSIS — M54.50 LOW BACK PAIN, NON-SPECIFIC: ICD-10-CM
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
## 2019-09-16 DIAGNOSIS — L03.113 CELLULITIS OF RIGHT UPPER EXTREMITY: ICD-10-CM

## 2019-09-16 DIAGNOSIS — M54.50 ACUTE LEFT-SIDED LOW BACK PAIN WITHOUT SCIATICA: Primary | ICD-10-CM

## 2019-09-16 DIAGNOSIS — M79.641 PAIN OF RIGHT HAND: ICD-10-CM

## 2019-09-16 PROCEDURE — 99999 PR PBB SHADOW E&M-EST. PATIENT-LVL IV: ICD-10-PCS | Mod: PBBFAC,,, | Performed by: FAMILY MEDICINE

## 2019-09-16 PROCEDURE — 99214 PR OFFICE/OUTPT VISIT, EST, LEVL IV, 30-39 MIN: ICD-10-PCS | Mod: S$PBB,,, | Performed by: FAMILY MEDICINE

## 2019-09-16 PROCEDURE — 99214 OFFICE O/P EST MOD 30 MIN: CPT | Mod: PBBFAC,PO | Performed by: FAMILY MEDICINE

## 2019-09-16 PROCEDURE — 99999 PR PBB SHADOW E&M-EST. PATIENT-LVL IV: CPT | Mod: PBBFAC,,, | Performed by: FAMILY MEDICINE

## 2019-09-16 PROCEDURE — 99214 OFFICE O/P EST MOD 30 MIN: CPT | Mod: S$PBB,,, | Performed by: FAMILY MEDICINE

## 2019-09-16 RX ORDER — CEPHALEXIN 500 MG/1
500 CAPSULE ORAL EVERY 12 HOURS
Qty: 14 CAPSULE | Refills: 0 | Status: SHIPPED | OUTPATIENT
Start: 2019-09-16 | End: 2019-09-23

## 2019-09-16 RX ORDER — TAFLUPROST 0 MG/.3ML
SOLUTION/ DROPS OPHTHALMIC
Refills: 6 | COMMUNITY
Start: 2019-06-19

## 2019-09-16 RX ORDER — DICLOFENAC SODIUM 10 MG/G
2 GEL TOPICAL DAILY
Qty: 100 G | Refills: 0 | Status: SHIPPED | OUTPATIENT
Start: 2019-09-16 | End: 2020-02-05

## 2019-09-16 RX ORDER — LOTEPREDNOL ETABONATE 5 MG/G
GEL OPHTHALMIC
COMMUNITY
Start: 2019-06-12 | End: 2020-02-05

## 2019-09-16 RX ORDER — METHAZOLAMIDE 25 MG/1
25 TABLET ORAL 3 TIMES DAILY
COMMUNITY
End: 2020-02-05

## 2019-09-16 NOTE — TELEPHONE ENCOUNTER
----- Message from Oumar Marcial sent at 9/16/2019  1:38 PM CDT -----  Contact: pt   Pt unable to drive to her appt would like to rescheduled for 09/17/2019 if possible       Pt can be reached at [phone]

## 2019-09-16 NOTE — PROGRESS NOTES
"Subjective:       Patient ID: Isabelle Roberts is a 85 y.o. female.    Chief Complaint: Joan Walton is a 85 y.o. female who presents today for an urgent care visit for back pain. She is normally followed by Dr. Bernal. She has a recently fall. Her PMHx is significant for breast cancer, hypothyroidism, HTN, and anxiety. She was going to the bathroom at night, it was dark, and she fell. She was able to get back up. She may have hit her head; she is unsure. No LOC. No involuntary loss of bowel or bladder or tongue biting. She did scrap her right hand. She has had some back pain/"tailbone" pain since then. This has been improving. She is currently resting for this pain. She did not take anything for the back pain because she started a new glaucoma medication except for 2 ibuprofen which helped her symptoms. She fell Wednesday night.     Back Pain   This is a new problem. The current episode started in the past 7 days. The problem occurs daily. The problem has been gradually improving since onset. The pain is present in the lumbar spine and sacro-iliac. The pain does not radiate. Pertinent negatives include no bladder incontinence, bowel incontinence, dysuria, fever, leg pain, numbness, paresis, pelvic pain, perianal numbness, tingling or weakness. Risk factors include history of cancer and recent trauma. She has tried NSAIDs for the symptoms.     Review of Systems   Constitutional: Negative for fever.   Gastrointestinal: Negative for bowel incontinence.   Genitourinary: Negative for bladder incontinence, dysuria and pelvic pain.   Musculoskeletal: Positive for back pain.   Skin: Positive for rash and wound.   Neurological: Negative for tingling, weakness and numbness.         Objective:     Vitals:    09/16/19 1112   BP: 108/70   Pulse: 83   Temp: 97.8 °F (36.6 °C)        Physical Exam   Constitutional: She is oriented to person, place, and time. She appears well-developed and well-nourished.   HENT:   Head: Normocephalic " and atraumatic.   Eyes: Pupils are equal, round, and reactive to light. Conjunctivae and EOM are normal.   Neck: Normal range of motion. Neck supple.   Cardiovascular: Normal rate and regular rhythm.   Pulmonary/Chest: Effort normal and breath sounds normal.   Musculoskeletal: She exhibits tenderness.   There is mild TTP of the sacrum/lumbar spine  No open lesion or wound or rashes noted  Mild paraspinal spasm noted BL   Neurological: She is alert and oriented to person, place, and time. No cranial nerve deficit or sensory deficit. She exhibits normal muscle tone.   Finger to nose intact  Heel to shin intact  Strength and sensation grossly intract BL UE/LE  CN 2-12 grossly intact  PERRLA  Rapid alternating movement intact  Patellar reflex present and equal BL   Skin: Abrasion and rash noted.   Wound noted (see picture below)     Psychiatric: She has a normal mood and affect. Her behavior is normal. Judgment and thought content normal.   Nursing note and vitals reviewed.              Assessment:       1. Acute left-sided low back pain without sciatica    2. Pain of right hand    3. Fall, initial encounter    4. Low back pain, non-specific    5. Abrasion    6. Cellulitis of right upper extremity        Plan:       XR of the affected areas  Neuro exam was normal  No focal findings on back exam  Hand appears to have a cellulitis, start oral antibiotics  If this worsens, RTC or contact your PCP, you may need wound care  salonpas (OTC) pain relief  voltaren gel  Tylenol for pain control    Acute left-sided low back pain without sciatica  -     X-Ray Lumbar Spine Complete 5 View; Future; Expected date: 09/16/2019  -     X-Ray Sacrum And Coccyx; Future; Expected date: 09/16/2019  -     diclofenac sodium (VOLTAREN) 1 % Gel; Apply 2 g topically once daily.  Dispense: 100 g; Refill: 0    Pain of right hand  -     X-Ray Hand 3 view Right; Future; Expected date: 09/16/2019  -     diclofenac sodium (VOLTAREN) 1 % Gel; Apply 2 g  topically once daily.  Dispense: 100 g; Refill: 0    Fall, initial encounter    Low back pain, non-specific  -     X-Ray Lumbar Spine Complete 5 View; Future; Expected date: 09/16/2019  -     diclofenac sodium (VOLTAREN) 1 % Gel; Apply 2 g topically once daily.  Dispense: 100 g; Refill: 0    Abrasion    Cellulitis of right upper extremity  -     cephALEXin (KEFLEX) 500 MG capsule; Take 1 capsule (500 mg total) by mouth every 12 (twelve) hours. for 7 days  Dispense: 14 capsule; Refill: 0        Warning signs discussed, patient to call with any further issues or worsening of symptoms.

## 2019-09-16 NOTE — PATIENT INSTRUCTIONS
XR of the affected areas  Hand appears to have a cellulitis, start oral antibiotics  If this worsens, RTC or contact our PCP, you may need wound care  salonpas (OTC) pain relief  voltaren gel  Tylenol for pain control

## 2019-09-17 ENCOUNTER — HOSPITAL ENCOUNTER (OUTPATIENT)
Dept: RADIOLOGY | Facility: HOSPITAL | Age: 84
Discharge: HOME | End: 2019-09-17
Attending: FAMILY MEDICINE
Payer: MEDICARE

## 2019-09-17 DIAGNOSIS — M54.50 ACUTE LEFT-SIDED LOW BACK PAIN WITHOUT SCIATICA: ICD-10-CM

## 2019-09-17 DIAGNOSIS — M79.641 PAIN OF RIGHT HAND: ICD-10-CM

## 2019-09-17 DIAGNOSIS — M54.50 LOW BACK PAIN, NON-SPECIFIC: ICD-10-CM

## 2019-09-17 PROCEDURE — 73130 X-RAY EXAM OF HAND: CPT | Mod: 26,RT,, | Performed by: RADIOLOGY

## 2019-09-17 PROCEDURE — 72110 X-RAY EXAM L-2 SPINE 4/>VWS: CPT | Mod: TC,FY,PO

## 2019-09-17 PROCEDURE — 72220 XR SACRUM AND COCCYX: ICD-10-PCS | Mod: 26,,, | Performed by: RADIOLOGY

## 2019-09-17 PROCEDURE — 73130 XR HAND COMPLETE 3 VIEW RIGHT: ICD-10-PCS | Mod: 26,RT,, | Performed by: RADIOLOGY

## 2019-09-17 PROCEDURE — 72110 X-RAY EXAM L-2 SPINE 4/>VWS: CPT | Mod: 26,,, | Performed by: RADIOLOGY

## 2019-09-17 PROCEDURE — 72220 X-RAY EXAM SACRUM TAILBONE: CPT | Mod: 26,,, | Performed by: RADIOLOGY

## 2019-09-17 PROCEDURE — 72110 XR LUMBAR SPINE COMPLETE 5 VIEW: ICD-10-PCS | Mod: 26,,, | Performed by: RADIOLOGY

## 2019-09-17 PROCEDURE — 73130 X-RAY EXAM OF HAND: CPT | Mod: TC,FY,PO,RT

## 2019-09-17 PROCEDURE — 72220 X-RAY EXAM SACRUM TAILBONE: CPT | Mod: TC,FY,PO

## 2019-09-18 ENCOUNTER — TELEPHONE (OUTPATIENT)
Dept: FAMILY MEDICINE | Facility: CLINIC | Age: 84
End: 2019-09-18

## 2019-09-18 NOTE — TELEPHONE ENCOUNTER
----- Message from Rachel Pitt sent at 9/18/2019  9:22 AM CDT -----  Contact: Self [phone]  TEST RESULTS:   Patient would like to get test results.  Name of test (lab, mammo, etc.): X-ray   Date of test: 9/17/19

## 2019-09-18 NOTE — TELEPHONE ENCOUNTER
I spoke with patient and informed her of Xray results. XR Hand showed moderate arthritis but no fracture. XR low back showed significant arthritis but no acute fracture or dislocation. XR of the sacrum (bottom of the spine) showed arthritis but no fracture. Patient voiced understanding.

## 2019-09-24 ENCOUNTER — IMMUNIZATION (OUTPATIENT)
Dept: FAMILY MEDICINE | Facility: CLINIC | Age: 84
End: 2019-09-24
Payer: MEDICARE

## 2019-09-24 DIAGNOSIS — Z23 NEED FOR INFLUENZA VACCINATION: Primary | ICD-10-CM

## 2019-09-24 PROCEDURE — 90662 IIV NO PRSV INCREASED AG IM: CPT | Mod: PBBFAC,PO

## 2019-10-01 ENCOUNTER — PES CALL (OUTPATIENT)
Dept: ADMINISTRATIVE | Facility: CLINIC | Age: 84
End: 2019-10-01

## 2019-10-01 DIAGNOSIS — F41.9 ANXIETY: ICD-10-CM

## 2019-10-01 RX ORDER — LORAZEPAM 1 MG/1
TABLET ORAL
Qty: 90 TABLET | Refills: 1 | Status: SHIPPED | OUTPATIENT
Start: 2019-10-01 | End: 2019-10-02 | Stop reason: SDUPTHER

## 2019-10-02 RX ORDER — LORAZEPAM 1 MG/1
1 TABLET ORAL NIGHTLY
Qty: 90 TABLET | Refills: 1 | Status: SHIPPED | OUTPATIENT
Start: 2019-10-02 | End: 2020-04-03

## 2019-10-02 NOTE — TELEPHONE ENCOUNTER
----- Message from Megan Prather sent at 10/2/2019 11:47 AM CDT -----  Contact: Merari bhandari/ [facility] [phone]  Type:  Pharmacy Calling to Clarify an RX    Name of Caller:Merari   Pharmacy Name:[facility]    Prescription Name:LORazepam (ATIVAN) 1 MG tablet   What do they need to clarify?: Verbal authorization   Best Call Back Number:[phone]  Additional Information:

## 2019-10-05 DIAGNOSIS — F51.04 CHRONIC INSOMNIA: ICD-10-CM

## 2019-10-05 RX ORDER — MIRTAZAPINE 15 MG/1
TABLET, FILM COATED ORAL
Qty: 90 TABLET | Refills: 3 | Status: SHIPPED | OUTPATIENT
Start: 2019-10-05 | End: 2020-08-05 | Stop reason: SDUPTHER

## 2019-10-07 ENCOUNTER — TELEPHONE (OUTPATIENT)
Dept: FAMILY MEDICINE | Facility: CLINIC | Age: 84
End: 2019-10-07

## 2019-10-07 NOTE — TELEPHONE ENCOUNTER
----- Message from Rocio Wild sent at 10/7/2019  9:12 AM CDT -----  Contact: Merari  [facility]  [phone]    Fax received for Lorazepam, prescription is a controlled substance and they need a verbal approval to fill it.

## 2019-10-07 NOTE — TELEPHONE ENCOUNTER
Spoke to pharmacist, Merari at [facility] and [facility]

## 2019-10-16 DIAGNOSIS — E03.4 HYPOTHYROIDISM DUE TO ACQUIRED ATROPHY OF THYROID: ICD-10-CM

## 2019-10-16 RX ORDER — LEVOTHYROXINE SODIUM 75 UG/1
75 TABLET ORAL
Qty: 90 TABLET | Refills: 3 | Status: SHIPPED | OUTPATIENT
Start: 2019-10-16 | End: 2020-08-05 | Stop reason: SDUPTHER

## 2019-11-07 ENCOUNTER — TELEPHONE (OUTPATIENT)
Dept: FAMILY MEDICINE | Facility: CLINIC | Age: 84
End: 2019-11-07

## 2019-11-07 DIAGNOSIS — Z23 NEED FOR INFLUENZA VACCINATION: Primary | ICD-10-CM

## 2019-11-07 NOTE — TELEPHONE ENCOUNTER
Discussed with patient that during a routine audit we discovered a variation in the temperature of our medication refrigerator which may have affected the potency and effectiveness of our vaccines. The vaccine was not harmful but may have been ineffective so we recommend revaccination. Pt. Reported she will get with daughter and come in some time this week. She verbalized understanding and all questions were answered.

## 2019-11-08 ENCOUNTER — CLINICAL SUPPORT (OUTPATIENT)
Dept: FAMILY MEDICINE | Facility: CLINIC | Age: 84
End: 2019-11-08
Payer: MEDICARE

## 2019-11-08 DIAGNOSIS — Z23 NEED FOR INFLUENZA VACCINATION: Primary | ICD-10-CM

## 2019-11-08 PROCEDURE — 90662 IIV NO PRSV INCREASED AG IM: CPT | Mod: PBBFAC,PO

## 2020-01-14 ENCOUNTER — DOCUMENTATION ONLY (OUTPATIENT)
Dept: INTERNAL MEDICINE | Facility: CLINIC | Age: 85
End: 2020-01-14

## 2020-02-05 ENCOUNTER — OFFICE VISIT (OUTPATIENT)
Dept: FAMILY MEDICINE | Facility: CLINIC | Age: 85
End: 2020-02-05
Attending: FAMILY MEDICINE
Payer: MEDICARE

## 2020-02-05 VITALS
SYSTOLIC BLOOD PRESSURE: 136 MMHG | DIASTOLIC BLOOD PRESSURE: 72 MMHG | WEIGHT: 131.38 LBS | HEART RATE: 82 BPM | HEIGHT: 66 IN | OXYGEN SATURATION: 98 % | TEMPERATURE: 99 F | BODY MASS INDEX: 21.11 KG/M2

## 2020-02-05 DIAGNOSIS — I10 ESSENTIAL HYPERTENSION: ICD-10-CM

## 2020-02-05 DIAGNOSIS — F41.9 ANXIETY: Primary | ICD-10-CM

## 2020-02-05 DIAGNOSIS — C50.412 MALIGNANT NEOPLASM OF UPPER-OUTER QUADRANT OF LEFT BREAST IN FEMALE, ESTROGEN RECEPTOR POSITIVE: ICD-10-CM

## 2020-02-05 DIAGNOSIS — R73.02 GLUCOSE INTOLERANCE (IMPAIRED GLUCOSE TOLERANCE): ICD-10-CM

## 2020-02-05 DIAGNOSIS — E03.4 HYPOTHYROIDISM DUE TO ACQUIRED ATROPHY OF THYROID: ICD-10-CM

## 2020-02-05 DIAGNOSIS — Z17.0 MALIGNANT NEOPLASM OF UPPER-OUTER QUADRANT OF LEFT BREAST IN FEMALE, ESTROGEN RECEPTOR POSITIVE: ICD-10-CM

## 2020-02-05 DIAGNOSIS — F51.04 CHRONIC INSOMNIA: ICD-10-CM

## 2020-02-05 PROCEDURE — 99214 OFFICE O/P EST MOD 30 MIN: CPT | Mod: S$PBB,,, | Performed by: FAMILY MEDICINE

## 2020-02-05 PROCEDURE — 99214 OFFICE O/P EST MOD 30 MIN: CPT | Mod: PBBFAC,PO | Performed by: FAMILY MEDICINE

## 2020-02-05 PROCEDURE — 99999 PR PBB SHADOW E&M-EST. PATIENT-LVL IV: CPT | Mod: PBBFAC,,, | Performed by: FAMILY MEDICINE

## 2020-02-05 PROCEDURE — 99214 PR OFFICE/OUTPT VISIT, EST, LEVL IV, 30-39 MIN: ICD-10-PCS | Mod: S$PBB,,, | Performed by: FAMILY MEDICINE

## 2020-02-05 PROCEDURE — 99999 PR PBB SHADOW E&M-EST. PATIENT-LVL IV: ICD-10-PCS | Mod: PBBFAC,,, | Performed by: FAMILY MEDICINE

## 2020-02-05 RX ORDER — ZOLPIDEM TARTRATE 10 MG/1
TABLET ORAL
Qty: 90 TABLET | Refills: 3 | Status: SHIPPED | OUTPATIENT
Start: 2020-02-05 | End: 2020-08-05

## 2020-02-05 RX ORDER — BENAZEPRIL HYDROCHLORIDE 40 MG/1
40 TABLET ORAL DAILY
Qty: 90 TABLET | Refills: 3 | Status: SHIPPED | OUTPATIENT
Start: 2020-02-05

## 2020-02-05 RX ORDER — AMLODIPINE BESYLATE 10 MG/1
10 TABLET ORAL DAILY
Qty: 90 TABLET | Refills: 3 | Status: SHIPPED | OUTPATIENT
Start: 2020-02-05

## 2020-02-05 NOTE — PROGRESS NOTES
Subjective:       Patient ID: Isabelle Roberts is a 86 y.o. female.    Chief Complaint: Follow-up    86 yr old pleasant white female with recurrent/chronic UTI, anxiety, insomnia, HTN, hypothyroidism, breast CA in remission, glaucoma, presents today for her 6 month follow up. No complaints today.    HTN - controlled- on norvasc and lisinopril daily - compliant - no side effects      Hypothyroidism - controlled - normal TSh - on synthroid - no side effects      Anxiety/insomnia - chronic and stable - takes ativan once a day and ambien at night and no side effects      Breast CA in remission - follows oncology      UTI - recurrent - gets infections every few months - want to have antibiotic handy - declines need for urology      History as below - reviewed      Health maintenance  -labs UTD  -mammo UTD  -vaccines UTD        Medication Refill   This is a chronic problem. The current episode started more than 1 year ago. The problem occurs constantly. The problem has been gradually improving. Pertinent negatives include no arthralgias, chest pain, congestion, coughing, diaphoresis, headaches, joint swelling, myalgias, nausea, neck pain, sore throat, visual change or weakness. Nothing aggravates the symptoms. Treatments tried: as below. The treatment provided significant relief.   Hypertension   This is a chronic problem. The current episode started more than 1 year ago. The problem has been gradually improving since onset. The problem is controlled. Associated symptoms include anxiety. Pertinent negatives include no chest pain, headaches, malaise/fatigue, neck pain, palpitations, PND or shortness of breath. There are no associated agents to hypertension. Risk factors for coronary artery disease include post-menopausal state. Past treatments include calcium channel blockers and ACE inhibitors. The current treatment provides significant improvement. There are no compliance problems.  There is no history of angina, CAD/MI,  CVA, left ventricular hypertrophy, PVD or retinopathy. Identifiable causes of hypertension include a thyroid problem. There is no history of chronic renal disease, coarctation of the aorta, a hypertension causing med or sleep apnea.   Thyroid Problem   Presents for follow-up visit. Symptoms include anxiety. Patient reports no cold intolerance, constipation, diaphoresis, diarrhea, heat intolerance, menstrual problem, palpitations, tremors or visual change. The symptoms have been stable. Past treatments include levothyroxine. The treatment provided significant relief. Prior procedures include thyroid ultrasound. There is no history of atrial fibrillation, diabetes, Graves' ophthalmopathy, hyperlipidemia, neuropathy or osteopenia. There are no known risk factors.   Anxiety   Presents for follow-up visit. Symptoms include excessive worry and nervous/anxious behavior. Patient reports no chest pain, confusion, decreased concentration, dizziness, nausea, palpitations, shortness of breath or suicidal ideas. Symptoms occur most days. Nothing aggravates the symptoms. The quality of sleep is fair. Nighttime awakenings: occasional.     There are no known risk factors. Her past medical history is significant for anxiety/panic attacks. There is no history of asthma, bipolar disorder, CAD, chronic lung disease, depression, fibromyalgia or suicide attempts. Past treatments include benzodiazephines. The treatment provided significant relief. Compliance with prior treatments has been good. Compliance with medications is %.     Review of Systems   Constitutional: Negative.  Negative for activity change, diaphoresis, malaise/fatigue and unexpected weight change.   HENT: Negative.  Negative for congestion, ear discharge, hearing loss, rhinorrhea, sore throat and voice change.    Eyes: Negative.  Negative for pain, discharge and visual disturbance.   Respiratory: Negative.  Negative for cough, chest tightness, shortness of breath  and wheezing.    Cardiovascular: Negative.  Negative for chest pain, palpitations and PND.   Gastrointestinal: Negative.  Negative for abdominal distention, anal bleeding, constipation, diarrhea and nausea.   Endocrine: Negative.  Negative for cold intolerance, heat intolerance, polydipsia and polyuria.   Genitourinary: Negative.  Negative for decreased urine volume, difficulty urinating, dysuria, frequency, menstrual problem and vaginal pain.   Musculoskeletal: Negative.  Negative for arthralgias, gait problem, joint swelling, myalgias and neck pain.   Skin: Negative.  Negative for color change, pallor and wound.   Allergic/Immunologic: Negative.  Negative for environmental allergies and immunocompromised state.   Neurological: Negative.  Negative for dizziness, tremors, seizures, speech difficulty, weakness and headaches.   Hematological: Negative.  Negative for adenopathy. Does not bruise/bleed easily.   Psychiatric/Behavioral: Negative for agitation, confusion, decreased concentration, hallucinations, self-injury and suicidal ideas. The patient is nervous/anxious.        PMH/PSH/FH/SH/MED/ALLERGY reviewed    Objective:       Vitals:    02/05/20 1121   BP: 136/72   Pulse: 82   Temp: 99.3 °F (37.4 °C)       Physical Exam   Constitutional: She is oriented to person, place, and time. She appears well-developed and well-nourished. No distress.   HENT:   Head: Normocephalic and atraumatic.   Right Ear: External ear normal.   Left Ear: External ear normal.   Nose: Nose normal.   Mouth/Throat: Oropharynx is clear and moist. No oropharyngeal exudate.   Eyes: Pupils are equal, round, and reactive to light. Conjunctivae and EOM are normal. Right eye exhibits no discharge. Left eye exhibits no discharge. No scleral icterus.   Neck: Normal range of motion. Neck supple. No JVD present. No tracheal deviation present. No thyromegaly present.   Cardiovascular: Normal rate, regular rhythm, normal heart sounds and intact distal  pulses. Exam reveals no gallop and no friction rub.   No murmur heard.  Pulmonary/Chest: Effort normal and breath sounds normal. No stridor. She has no wheezes. She has no rales. She exhibits no tenderness.   Abdominal: Soft. Bowel sounds are normal. She exhibits no distension and no mass. There is no tenderness. There is no rebound and no guarding. No hernia.   Musculoskeletal: Normal range of motion. She exhibits no edema or tenderness.   Lymphadenopathy:     She has no cervical adenopathy.   Neurological: She is alert and oriented to person, place, and time. She has normal reflexes. She displays normal reflexes. No cranial nerve deficit. She exhibits normal muscle tone. Coordination normal.   Skin: Skin is warm and dry. No rash noted. She is not diaphoretic. No erythema. No pallor.   Psychiatric: She has a normal mood and affect. Her behavior is normal. Judgment and thought content normal.       Assessment:       1. Anxiety    2. Malignant neoplasm of upper-outer quadrant of left breast in female, estrogen receptor positive    3. Essential hypertension    4. Glucose intolerance (impaired glucose tolerance)    5. Hypothyroidism due to acquired atrophy of thyroid    6. Chronic insomnia        Plan:           Isabelle was seen today for follow-up.    Diagnoses and all orders for this visit:    Anxiety    Malignant neoplasm of upper-outer quadrant of left breast in female, estrogen receptor positive    Essential hypertension  -     benazepriL (LOTENSIN) 40 MG tablet; Take 1 tablet (40 mg total) by mouth once daily.  -     amLODIPine (NORVASC) 10 MG tablet; Take 1 tablet (10 mg total) by mouth once daily.    Glucose intolerance (impaired glucose tolerance)    Hypothyroidism due to acquired atrophy of thyroid    Chronic insomnia  -     zolpidem (AMBIEN) 10 mg Tab; TAKE 1 TABLET BY MOUTH ONCE DAILY IN THE EVENING AS NEEDED        HTN  -controlled  -refilled meds  -labs    Hypothyroidism  -controlled      Chronic  insomnia  Ambien 10 prn  -sleep hygiene    Left breast CA  -in remission  -follows oncology    Anxiety  -controlled  -continue REMERON  -ativan prn. Side effects of medications have been discussed and patient agreed to proceed with treatment and understands the risks and benefits.    Spent adequate time in obtaining history and explaining differentials    25 minutes spent during this visit of which greater than 50% devoted to face-face counseling and coordination of care regarding diagnosis and management plan    Follow up in about 6 months (around 8/5/2020), or if symptoms worsen or fail to improve.

## 2020-04-03 DIAGNOSIS — F41.9 ANXIETY: ICD-10-CM

## 2020-04-03 RX ORDER — LORAZEPAM 1 MG/1
TABLET ORAL
Qty: 30 TABLET | Refills: 0 | Status: SHIPPED | OUTPATIENT
Start: 2020-04-03 | End: 2020-05-11

## 2020-04-03 NOTE — TELEPHONE ENCOUNTER
----- Message from Jessica Brown sent at 4/3/2020 11:08 AM CDT -----  Contact: patient   Called about refill on prescription.  # [phone]

## 2020-05-11 DIAGNOSIS — F41.9 ANXIETY: ICD-10-CM

## 2020-05-11 RX ORDER — LORAZEPAM 1 MG/1
TABLET ORAL
Qty: 90 TABLET | Refills: 0 | Status: SHIPPED | OUTPATIENT
Start: 2020-05-11 | End: 2020-07-27

## 2020-08-05 ENCOUNTER — LAB VISIT (OUTPATIENT)
Dept: LAB | Facility: HOSPITAL | Age: 85
End: 2020-08-05
Attending: FAMILY MEDICINE
Payer: MEDICARE

## 2020-08-05 ENCOUNTER — OFFICE VISIT (OUTPATIENT)
Dept: FAMILY MEDICINE | Facility: CLINIC | Age: 85
End: 2020-08-05
Attending: FAMILY MEDICINE
Payer: MEDICARE

## 2020-08-05 VITALS
DIASTOLIC BLOOD PRESSURE: 76 MMHG | TEMPERATURE: 99 F | BODY MASS INDEX: 20.9 KG/M2 | HEART RATE: 78 BPM | HEIGHT: 66 IN | WEIGHT: 130.06 LBS | SYSTOLIC BLOOD PRESSURE: 139 MMHG | OXYGEN SATURATION: 98 %

## 2020-08-05 DIAGNOSIS — Z00.00 ROUTINE GENERAL MEDICAL EXAMINATION AT A HEALTH CARE FACILITY: Primary | ICD-10-CM

## 2020-08-05 DIAGNOSIS — F41.9 ANXIETY: ICD-10-CM

## 2020-08-05 DIAGNOSIS — R73.02 GLUCOSE INTOLERANCE (IMPAIRED GLUCOSE TOLERANCE): ICD-10-CM

## 2020-08-05 DIAGNOSIS — Z17.0 MALIGNANT NEOPLASM OF UPPER-OUTER QUADRANT OF LEFT BREAST IN FEMALE, ESTROGEN RECEPTOR POSITIVE: ICD-10-CM

## 2020-08-05 DIAGNOSIS — I10 ESSENTIAL HYPERTENSION: ICD-10-CM

## 2020-08-05 DIAGNOSIS — C50.412 MALIGNANT NEOPLASM OF UPPER-OUTER QUADRANT OF LEFT BREAST IN FEMALE, ESTROGEN RECEPTOR POSITIVE: ICD-10-CM

## 2020-08-05 DIAGNOSIS — E03.4 HYPOTHYROIDISM DUE TO ACQUIRED ATROPHY OF THYROID: ICD-10-CM

## 2020-08-05 DIAGNOSIS — F51.04 CHRONIC INSOMNIA: ICD-10-CM

## 2020-08-05 DIAGNOSIS — Z00.00 ROUTINE GENERAL MEDICAL EXAMINATION AT A HEALTH CARE FACILITY: ICD-10-CM

## 2020-08-05 LAB
ALBUMIN SERPL BCP-MCNC: 3.8 G/DL (ref 3.5–5.2)
ALP SERPL-CCNC: 110 U/L (ref 55–135)
ALT SERPL W/O P-5'-P-CCNC: 22 U/L (ref 10–44)
ANION GAP SERPL CALC-SCNC: 8 MMOL/L (ref 8–16)
AST SERPL-CCNC: 21 U/L (ref 10–40)
BASOPHILS # BLD AUTO: 0.1 K/UL (ref 0–0.2)
BASOPHILS NFR BLD: 1.5 % (ref 0–1.9)
BILIRUB SERPL-MCNC: 0.5 MG/DL (ref 0.1–1)
BUN SERPL-MCNC: 14 MG/DL (ref 8–23)
CALCIUM SERPL-MCNC: 9.3 MG/DL (ref 8.7–10.5)
CHLORIDE SERPL-SCNC: 104 MMOL/L (ref 95–110)
CHOLEST SERPL-MCNC: 209 MG/DL (ref 120–199)
CHOLEST/HDLC SERPL: 3 {RATIO} (ref 2–5)
CO2 SERPL-SCNC: 27 MMOL/L (ref 23–29)
CREAT SERPL-MCNC: 0.8 MG/DL (ref 0.5–1.4)
DIFFERENTIAL METHOD: NORMAL
EOSINOPHIL # BLD AUTO: 0.4 K/UL (ref 0–0.5)
EOSINOPHIL NFR BLD: 5.2 % (ref 0–8)
ERYTHROCYTE [DISTWIDTH] IN BLOOD BY AUTOMATED COUNT: 12.2 % (ref 11.5–14.5)
EST. GFR  (AFRICAN AMERICAN): >60 ML/MIN/1.73 M^2
EST. GFR  (NON AFRICAN AMERICAN): >60 ML/MIN/1.73 M^2
ESTIMATED AVG GLUCOSE: 146 MG/DL (ref 68–131)
GLUCOSE SERPL-MCNC: 123 MG/DL (ref 70–110)
HBA1C MFR BLD HPLC: 6.7 % (ref 4–5.6)
HCT VFR BLD AUTO: 39.9 % (ref 37–48.5)
HDLC SERPL-MCNC: 69 MG/DL (ref 40–75)
HDLC SERPL: 33 % (ref 20–50)
HGB BLD-MCNC: 13 G/DL (ref 12–16)
IMM GRANULOCYTES # BLD AUTO: 0.02 K/UL (ref 0–0.04)
IMM GRANULOCYTES NFR BLD AUTO: 0.3 % (ref 0–0.5)
LDLC SERPL CALC-MCNC: 121.8 MG/DL (ref 63–159)
LYMPHOCYTES # BLD AUTO: 1.9 K/UL (ref 1–4.8)
LYMPHOCYTES NFR BLD: 27.3 % (ref 18–48)
MCH RBC QN AUTO: 30.3 PG (ref 27–31)
MCHC RBC AUTO-ENTMCNC: 32.6 G/DL (ref 32–36)
MCV RBC AUTO: 93 FL (ref 82–98)
MONOCYTES # BLD AUTO: 0.8 K/UL (ref 0.3–1)
MONOCYTES NFR BLD: 11.2 % (ref 4–15)
NEUTROPHILS # BLD AUTO: 3.7 K/UL (ref 1.8–7.7)
NEUTROPHILS NFR BLD: 54.5 % (ref 38–73)
NONHDLC SERPL-MCNC: 140 MG/DL
NRBC BLD-RTO: 0 /100 WBC
PLATELET # BLD AUTO: 288 K/UL (ref 150–350)
PMV BLD AUTO: 10.2 FL (ref 9.2–12.9)
POTASSIUM SERPL-SCNC: 3.9 MMOL/L (ref 3.5–5.1)
PROT SERPL-MCNC: 7.3 G/DL (ref 6–8.4)
RBC # BLD AUTO: 4.29 M/UL (ref 4–5.4)
SODIUM SERPL-SCNC: 139 MMOL/L (ref 136–145)
TRIGL SERPL-MCNC: 91 MG/DL (ref 30–150)
TSH SERPL DL<=0.005 MIU/L-ACNC: 0.77 UIU/ML (ref 0.4–4)
WBC # BLD AUTO: 6.77 K/UL (ref 3.9–12.7)

## 2020-08-05 PROCEDURE — 83036 HEMOGLOBIN GLYCOSYLATED A1C: CPT

## 2020-08-05 PROCEDURE — 85025 COMPLETE CBC W/AUTO DIFF WBC: CPT

## 2020-08-05 PROCEDURE — 99999 PR PBB SHADOW E&M-EST. PATIENT-LVL IV: ICD-10-PCS | Mod: PBBFAC,,, | Performed by: FAMILY MEDICINE

## 2020-08-05 PROCEDURE — 36415 COLL VENOUS BLD VENIPUNCTURE: CPT

## 2020-08-05 PROCEDURE — 99999 PR PBB SHADOW E&M-EST. PATIENT-LVL IV: CPT | Mod: PBBFAC,,, | Performed by: FAMILY MEDICINE

## 2020-08-05 PROCEDURE — 84443 ASSAY THYROID STIM HORMONE: CPT

## 2020-08-05 PROCEDURE — 99397 PER PM REEVAL EST PAT 65+ YR: CPT | Mod: S$PBB,,, | Performed by: FAMILY MEDICINE

## 2020-08-05 PROCEDURE — 80061 LIPID PANEL: CPT

## 2020-08-05 PROCEDURE — 99397 PR PREVENTIVE VISIT,EST,65 & OVER: ICD-10-PCS | Mod: S$PBB,,, | Performed by: FAMILY MEDICINE

## 2020-08-05 PROCEDURE — 99214 OFFICE O/P EST MOD 30 MIN: CPT | Mod: PBBFAC,PO | Performed by: FAMILY MEDICINE

## 2020-08-05 PROCEDURE — 80053 COMPREHEN METABOLIC PANEL: CPT

## 2020-08-05 RX ORDER — LORAZEPAM 1 MG/1
1 TABLET ORAL NIGHTLY
Qty: 90 TABLET | Refills: 3 | Status: SHIPPED | OUTPATIENT
Start: 2020-08-05

## 2020-08-05 RX ORDER — LEVOTHYROXINE SODIUM 75 UG/1
75 TABLET ORAL
Qty: 90 TABLET | Refills: 3 | Status: SHIPPED | OUTPATIENT
Start: 2020-08-05

## 2020-08-05 RX ORDER — MIRTAZAPINE 15 MG/1
TABLET, FILM COATED ORAL
Qty: 90 TABLET | Refills: 3 | Status: SHIPPED | OUTPATIENT
Start: 2020-08-05

## 2020-08-05 NOTE — PROGRESS NOTES
Subjective:       Patient ID: Isabelle Roberts is a 86 y.o. female.    Chief Complaint: Annual Exam and Medication Refill    86 yr old pleasant white female with recurrent/chronic UTI, anxiety, insomnia, HTN, hypothyroidism, breast CA in remission, glaucoma, presents today for her annual wellness check, lab work and 6 month follow up. No complaints today.    HTN - controlled- on norvasc and lisinopril daily - compliant - no side effects      Hypothyroidism - controlled - normal TSh - on synthroid - no side effects      Anxiety/insomnia - chronic and stable - takes ativan once a day and ambien at night and no side effects      Breast CA in remission - follows oncology      UTI - recurrent - gets infections every few months - want to have antibiotic handy - declines need for urology      History as below - reviewed      Health maintenance  -labs UTD  -mammo UTD  -vaccines UTD        Medication Refill  This is a chronic problem. The current episode started more than 1 year ago. The problem occurs constantly. The problem has been gradually improving. Pertinent negatives include no arthralgias, chest pain, congestion, coughing, diaphoresis, headaches, joint swelling, myalgias, nausea, neck pain, sore throat, visual change or weakness. Nothing aggravates the symptoms. Treatments tried: as below. The treatment provided significant relief.   Hypertension  This is a chronic problem. The current episode started more than 1 year ago. The problem has been gradually improving since onset. The problem is controlled. Associated symptoms include anxiety. Pertinent negatives include no chest pain, headaches, malaise/fatigue, neck pain, palpitations, PND or shortness of breath. There are no associated agents to hypertension. Risk factors for coronary artery disease include post-menopausal state. Past treatments include calcium channel blockers and ACE inhibitors. The current treatment provides significant improvement. There are no  compliance problems.  There is no history of angina, CAD/MI, CVA, left ventricular hypertrophy, PVD or retinopathy. Identifiable causes of hypertension include a thyroid problem. There is no history of chronic renal disease, coarctation of the aorta, a hypertension causing med or sleep apnea.   Thyroid Problem  Presents for follow-up visit. Symptoms include anxiety. Patient reports no cold intolerance, constipation, diaphoresis, diarrhea, heat intolerance, menstrual problem, palpitations, tremors or visual change. The symptoms have been stable. Past treatments include levothyroxine. The treatment provided significant relief. Prior procedures include thyroid ultrasound. There is no history of atrial fibrillation, diabetes, Graves' ophthalmopathy, hyperlipidemia, neuropathy or osteopenia. There are no known risk factors.   Anxiety  Presents for follow-up visit. Symptoms include excessive worry and nervous/anxious behavior. Patient reports no chest pain, confusion, decreased concentration, dizziness, nausea, palpitations, shortness of breath or suicidal ideas. Symptoms occur most days. Nothing aggravates the symptoms. The quality of sleep is fair. Nighttime awakenings: occasional.     There are no known risk factors. Her past medical history is significant for anxiety/panic attacks. There is no history of asthma, bipolar disorder, CAD, chronic lung disease, depression, fibromyalgia or suicide attempts. Past treatments include benzodiazephines. The treatment provided significant relief. Compliance with prior treatments has been good. Compliance with medications is %.     Review of Systems   Constitutional: Negative.  Negative for activity change, diaphoresis, malaise/fatigue and unexpected weight change.   HENT: Negative.  Negative for nasal congestion, ear discharge, hearing loss, rhinorrhea, sore throat and voice change.    Eyes: Negative.  Negative for pain, discharge and visual disturbance.   Respiratory:  Negative.  Negative for cough, chest tightness, shortness of breath and wheezing.    Cardiovascular: Negative.  Negative for chest pain, palpitations and PND.   Gastrointestinal: Negative.  Negative for abdominal distention, anal bleeding, constipation, diarrhea and nausea.   Endocrine: Negative.  Negative for cold intolerance, heat intolerance, polydipsia and polyuria.   Genitourinary: Negative.  Negative for decreased urine volume, difficulty urinating, dysuria, frequency, menstrual problem and vaginal pain.   Musculoskeletal: Negative.  Negative for arthralgias, gait problem, joint swelling, myalgias and neck pain.   Integumentary:  Negative for color change, pallor and wound. Negative.   Allergic/Immunologic: Negative.  Negative for environmental allergies and immunocompromised state.   Neurological: Negative.  Negative for dizziness, tremors, seizures, speech difficulty, weakness and headaches.   Hematological: Negative.  Negative for adenopathy. Does not bruise/bleed easily.   Psychiatric/Behavioral: Negative for agitation, confusion, decreased concentration, hallucinations, self-injury and suicidal ideas. The patient is nervous/anxious.      Active Ambulatory Problems     Diagnosis Date Noted    Malignant neoplasm of upper-outer quadrant of left breast in female, estrogen receptor positive 10/01/2012    Hot flashes due to surgical menopause 10/01/2012    Essential hypertension 01/29/2013    Chronic insomnia 01/29/2013    Glaucoma (increased eye pressure) 01/29/2013    Hypothyroidism 01/29/2013    Chronic rhinitis 03/07/2014    Glaucoma 07/16/2015    Allergic rhinitis 07/16/2015    History of breast cancer 07/16/2015    Anxiety 02/25/2016    Glucose intolerance (impaired glucose tolerance) 03/27/2017    Recurrent UTI 04/11/2018     Resolved Ambulatory Problems     Diagnosis Date Noted    Other abnormal glucose 01/29/2013     Past Medical History:   Diagnosis Date    Allergy     Arthritis      Breast cancer     Cancer     Glaucoma     Hyperlipidemia     Hypertension     Insomnia     Raynaud's disease     Recurrent cystitis     Thyroid disease      Past Surgical History:   Procedure Laterality Date    APPENDECTOMY      BREAST LUMPECTOMY      HYSTERECTOMY       Family History   Problem Relation Age of Onset    Asthma Son     Allergies Son     Allergic rhinitis Son     Asthma Grandchild     Allergies Grandchild     Allergic rhinitis Grandchild     Breast cancer Paternal Cousin     Angioedema Neg Hx     Eczema Neg Hx     Immunodeficiency Neg Hx      Social History     Socioeconomic History    Marital status:      Spouse name: Not on file    Number of children: Not on file    Years of education: Not on file    Highest education level: Not on file   Occupational History     Employer: Retired   Social Needs    Financial resource strain: Not on file    Food insecurity     Worry: Not on file     Inability: Not on file    Transportation needs     Medical: Not on file     Non-medical: Not on file   Tobacco Use    Smoking status: Former Smoker     Packs/day: 0.50     Years: 10.00     Pack years: 5.00     Types: Cigarettes     Quit date:      Years since quittin.6    Smokeless tobacco: Never Used   Substance and Sexual Activity    Alcohol use: Yes    Drug use: No    Sexual activity: Not on file   Lifestyle    Physical activity     Days per week: Not on file     Minutes per session: Not on file    Stress: Not on file   Relationships    Social connections     Talks on phone: Not on file     Gets together: Not on file     Attends Quaker service: Not on file     Active member of club or organization: Not on file     Attends meetings of clubs or organizations: Not on file     Relationship status: Not on file   Other Topics Concern    Not on file   Social History Narrative    Not on file     Review of patient's allergies indicates:   Allergen Reactions    Sunscreen  Other (See Comments)     redness    Sulfa (sulfonamide antibiotics) Rash     Current Outpatient Medications on File Prior to Visit   Medication Sig Dispense Refill    amLODIPine (NORVASC) 10 MG tablet Take 1 tablet (10 mg total) by mouth once daily. 90 tablet 3    benazepriL (LOTENSIN) 40 MG tablet Take 1 tablet (40 mg total) by mouth once daily. 90 tablet 3    dorzolamide-timolol 2-0.5% (COSOPT) 22.3-6.8 mg/mL ophthalmic solution Place 1 drop into both eyes 2 (two) times daily.       erythromycin (ROMYCIN) ophthalmic ointment       LUMIGAN 0.01 % Drop Place 1 drop into the right eye every evening.       tretinoin (RETIN-A) 0.05 % cream APPLY ON FACE TOPICALLY AT BEDTIME TILL ACNE CLEARS 20 g 0    tretinoin (RETIN-A) 0.05 % cream APPLY  CREAM TOPICALLY ON FACE AT BEDTIME TILL ACNE CLEARS 45 g 3    ZIOPTAN, PF, 0.0015 % Dpet INSTILL 1 DROP INTO EACH EYE ONCE DAILY  6    zolpidem (AMBIEN) 10 mg Tab TAKE 1 TABLET BY MOUTH ONCE DAILY IN THE EVENING AS NEEDED 90 tablet 3    [DISCONTINUED] LORazepam (ATIVAN) 1 MG tablet Take 1 tablet by mouth in the evening 90 tablet 0    [DISCONTINUED] mirtazapine (REMERON) 15 MG tablet TAKE 1 TABLET (15 MG TOTAL) BY MOUTH AT BEDTIME 90 tablet 3    [DISCONTINUED] SYNTHROID 75 mcg tablet TAKE 1 TABLET (75 MCG TOTAL) BY MOUTH BEFORE BREAKFAST 90 tablet 3     No current facility-administered medications on file prior to visit.            Objective:       Vitals:    08/05/20 1055   BP: 139/76   Pulse: 78   Temp: 99 °F (37.2 °C)       Physical Exam  Constitutional:       General: She is not in acute distress.     Appearance: She is well-developed. She is not diaphoretic.   HENT:      Head: Normocephalic and atraumatic.      Right Ear: External ear normal.      Left Ear: External ear normal.      Nose: Nose normal.      Mouth/Throat:      Pharynx: No oropharyngeal exudate.   Eyes:      General: No scleral icterus.        Right eye: No discharge.         Left eye: No discharge.       Conjunctiva/sclera: Conjunctivae normal.      Pupils: Pupils are equal, round, and reactive to light.   Neck:      Musculoskeletal: Normal range of motion and neck supple.      Thyroid: No thyromegaly.      Vascular: No JVD.      Trachea: No tracheal deviation.   Cardiovascular:      Rate and Rhythm: Normal rate and regular rhythm.      Heart sounds: Normal heart sounds. No murmur. No friction rub. No gallop.    Pulmonary:      Effort: Pulmonary effort is normal.      Breath sounds: Normal breath sounds. No stridor. No wheezing or rales.   Chest:      Chest wall: No tenderness.   Abdominal:      General: Bowel sounds are normal. There is no distension.      Palpations: Abdomen is soft. There is no mass.      Tenderness: There is no abdominal tenderness. There is no guarding or rebound.      Hernia: No hernia is present.   Musculoskeletal: Normal range of motion.         General: No tenderness.   Lymphadenopathy:      Cervical: No cervical adenopathy.   Skin:     General: Skin is warm and dry.      Coloration: Skin is not pale.      Findings: No erythema or rash.   Neurological:      Mental Status: She is alert and oriented to person, place, and time.      Cranial Nerves: No cranial nerve deficit.      Motor: No abnormal muscle tone.      Coordination: Coordination normal.      Deep Tendon Reflexes: Reflexes are normal and symmetric. Reflexes normal.   Psychiatric:         Behavior: Behavior normal.         Thought Content: Thought content normal.         Judgment: Judgment normal.         Assessment:       1. Routine general medical examination at a health care facility    2. Malignant neoplasm of upper-outer quadrant of left breast in female, estrogen receptor positive    3. Hypothyroidism due to acquired atrophy of thyroid    4. Glucose intolerance (impaired glucose tolerance)    5. Essential hypertension    6. Anxiety    7. Chronic insomnia        Plan:           Isabelle was seen today for annual exam and  medication refill.    Diagnoses and all orders for this visit:    Routine general medical examination at a health care facility  -     CBC auto differential; Future  -     Comprehensive metabolic panel; Future  -     Lipid Panel; Future  -     TSH; Future    Malignant neoplasm of upper-outer quadrant of left breast in female, estrogen receptor positive    Hypothyroidism due to acquired atrophy of thyroid  -     TSH; Future  -     SYNTHROID 75 mcg tablet; Take 1 tablet (75 mcg total) by mouth before breakfast.    Glucose intolerance (impaired glucose tolerance)  -     Comprehensive metabolic panel; Future  -     Hemoglobin A1C; Future    Essential hypertension  -     CBC auto differential; Future  -     Comprehensive metabolic panel; Future  -     Lipid Panel; Future  -     TSH; Future    Anxiety  -     TSH; Future  -     LORazepam (ATIVAN) 1 MG tablet; Take 1 tablet (1 mg total) by mouth every evening.    Chronic insomnia  -     mirtazapine (REMERON) 15 MG tablet; TAKE 1 TABLET (15 MG TOTAL) BY MOUTH AT BEDTIME      Wellness check  -normal exam  -labs           HTN  -controlled  -refilled meds  -labs    Hypothyroidism  -controlled      Chronic insomnia  Ambien 10 prn  -sleep hygiene    Left breast CA  -in remission  -follows oncology    Anxiety  -controlled  -continue REMERON  -ativan prn. Side effects of medications have been discussed and patient agreed to proceed with treatment and understands the risks and benefits.    Spent adequate time in obtaining history and explaining differentials      Follow up in about 1 year (around 8/5/2021), or if symptoms worsen or fail to improve.

## 2020-08-10 ENCOUNTER — TELEPHONE (OUTPATIENT)
Dept: FAMILY MEDICINE | Facility: CLINIC | Age: 85
End: 2020-08-10

## 2020-08-10 NOTE — TELEPHONE ENCOUNTER
----- Message from Katt Bustamante sent at 8/10/2020  9:33 AM CDT -----  Type:  Needs Medical Advice    Who Called: self  Reason:Wants to know if you can mail her blood work results to her   Would the patient rather a call back or a response via [facility]? callback  Best Call Back Number: [phone]  Additional Information: none

## 2020-08-12 ENCOUNTER — TELEPHONE (OUTPATIENT)
Dept: FAMILY MEDICINE | Facility: CLINIC | Age: 85
End: 2020-08-12

## 2020-08-12 NOTE — TELEPHONE ENCOUNTER
Left msg for CB to inform pt that her sugar numbers went up high and need to be on some medicine to lower it - it is in diabetic range - please call back for recommendations

## 2020-08-12 NOTE — TELEPHONE ENCOUNTER
----- Message from Devin Bernal MD sent at 8/12/2020  8:56 AM CDT -----  Call    Sugar numbers went up high and need to be on some medicine to lower it - it is in diabetic range - please call back for recommendations

## 2020-08-19 ENCOUNTER — TELEPHONE (OUTPATIENT)
Dept: FAMILY MEDICINE | Facility: CLINIC | Age: 85
End: 2020-08-19

## 2020-08-19 DIAGNOSIS — R73.02 GLUCOSE INTOLERANCE (IMPAIRED GLUCOSE TOLERANCE): Primary | ICD-10-CM

## 2020-08-21 ENCOUNTER — TELEPHONE (OUTPATIENT)
Dept: FAMILY MEDICINE | Facility: CLINIC | Age: 85
End: 2020-08-21

## 2020-08-21 DIAGNOSIS — B37.31 YEAST VAGINITIS: Primary | ICD-10-CM

## 2020-08-21 RX ORDER — FLUCONAZOLE 150 MG/1
150 TABLET ORAL DAILY
Qty: 1 TABLET | Refills: 0 | Status: SHIPPED | OUTPATIENT
Start: 2020-08-21 | End: 2020-08-22

## 2020-08-21 NOTE — TELEPHONE ENCOUNTER
Patient is requesting prescription for yeast infection.  She has tried OTC medications with no relief.  Please advise.      Attempted to return call to get more info, no answer.

## 2020-08-21 NOTE — TELEPHONE ENCOUNTER
----- Message from Zeny Banegas sent at 8/21/2020  1:16 PM CDT -----  Type:  Needs Medical Advice    Who Called:  Isabelle  Symptoms (please be specific):  vaginal yeast infection   How long has patient had these symptoms: 1 week  Pharmacy name and phone #:   n/a  Would the patient rather a call back or a response via [facility]?  Call back  Best Call Back Number: [phone]   Additional Information: pt has tried monistat and other over the counter products but they didn't work.  Leave a message if she doesn't answer, would like to be prescribed something.

## 2020-08-27 ENCOUNTER — OFFICE VISIT (OUTPATIENT)
Dept: FAMILY MEDICINE | Facility: CLINIC | Age: 85
End: 2020-08-27
Attending: FAMILY MEDICINE
Payer: MEDICARE

## 2020-08-27 VITALS
OXYGEN SATURATION: 96 % | TEMPERATURE: 98 F | DIASTOLIC BLOOD PRESSURE: 68 MMHG | HEART RATE: 77 BPM | WEIGHT: 128.5 LBS | HEIGHT: 65 IN | SYSTOLIC BLOOD PRESSURE: 134 MMHG | BODY MASS INDEX: 21.41 KG/M2

## 2020-08-27 DIAGNOSIS — B37.31 YEAST VAGINITIS: Primary | ICD-10-CM

## 2020-08-27 PROCEDURE — 99214 OFFICE O/P EST MOD 30 MIN: CPT | Mod: S$PBB,,, | Performed by: FAMILY MEDICINE

## 2020-08-27 PROCEDURE — 99214 PR OFFICE/OUTPT VISIT, EST, LEVL IV, 30-39 MIN: ICD-10-PCS | Mod: S$PBB,,, | Performed by: FAMILY MEDICINE

## 2020-08-27 PROCEDURE — 99999 PR PBB SHADOW E&M-EST. PATIENT-LVL V: ICD-10-PCS | Mod: PBBFAC,,, | Performed by: FAMILY MEDICINE

## 2020-08-27 PROCEDURE — 99999 PR PBB SHADOW E&M-EST. PATIENT-LVL V: CPT | Mod: PBBFAC,,, | Performed by: FAMILY MEDICINE

## 2020-08-27 PROCEDURE — 99215 OFFICE O/P EST HI 40 MIN: CPT | Mod: PBBFAC,PO | Performed by: FAMILY MEDICINE

## 2020-08-27 RX ORDER — FLUCONAZOLE 150 MG/1
150 TABLET ORAL
Qty: 5 TABLET | Refills: 0 | Status: SHIPPED | OUTPATIENT
Start: 2020-08-27 | End: 2020-09-01

## 2020-08-27 NOTE — PROGRESS NOTES
Subjective:       Patient ID: Isabelle Roberts is a 86 y.o. female.    Chief Complaint: Vaginitis (12 days)    86 yr old pleasant white female with recurrent/chronic UTI, anxiety, insomnia, HTN, hypothyroidism, breast CA in remission, glaucoma, presents today for evaluation f vaginal discharge and irritation. Onset 3-4 days ago and gradually worsening. No dysuria or odor in discharge. Details as follows -        Vaginal Discharge  The patient's primary symptoms include vaginal discharge. The patient's pertinent negatives include no genital itching, genital odor, genital rash or pelvic pain. This is a new problem. The current episode started in the past 7 days. The problem occurs constantly. The problem has been gradually worsening. She is not pregnant. Pertinent negatives include no abdominal pain, back pain, constipation, discolored urine, dysuria, frequency, headaches, joint pain, joint swelling, nausea, painful intercourse, sore throat or vomiting. The vaginal discharge was copious. She has not been passing clots. The symptoms are aggravated by activity. She has tried nothing for the symptoms. The treatment provided no relief. She is not sexually active. No, her partner does not have an STD. She is postmenopausal. There is no history of an abdominal surgery, a gynecological surgery, herpes simplex, metrorrhagia, ovarian cysts, perineal abscess, an STD or vaginosis.     Review of Systems   Constitutional: Negative.  Negative for activity change, diaphoresis and unexpected weight change.   HENT: Negative.  Negative for nasal congestion, ear discharge, hearing loss, rhinorrhea, sore throat and voice change.    Eyes: Negative.  Negative for pain, discharge and visual disturbance.   Respiratory: Negative.  Negative for chest tightness, shortness of breath and wheezing.    Cardiovascular: Negative.  Negative for chest pain.   Gastrointestinal: Negative.  Negative for abdominal distention, abdominal pain, anal bleeding,  constipation, nausea and vomiting.   Endocrine: Negative.  Negative for cold intolerance, polydipsia and polyuria.   Genitourinary: Positive for vaginal discharge. Negative for decreased urine volume, difficulty urinating, dysuria, frequency, menstrual problem, pelvic pain and vaginal pain.   Musculoskeletal: Negative.  Negative for arthralgias, back pain, gait problem, joint pain and myalgias.   Integumentary:  Negative for color change, pallor and wound. Negative.   Allergic/Immunologic: Negative.  Negative for environmental allergies and immunocompromised state.   Neurological: Negative.  Negative for dizziness, tremors, seizures, speech difficulty and headaches.   Hematological: Negative.  Negative for adenopathy. Does not bruise/bleed easily.   Psychiatric/Behavioral: Negative.  Negative for agitation, confusion, decreased concentration, hallucinations, self-injury and suicidal ideas. The patient is not nervous/anxious.           PMH/PSH/FH/SH/MED/ALLERGY reviewed    Objective:       Vitals:    08/27/20 1121   BP: 134/68   Pulse: 77   Temp: 98.3 °F (36.8 °C)       Physical Exam  Constitutional:       General: She is not in acute distress.     Appearance: She is well-developed. She is not diaphoretic.   HENT:      Head: Normocephalic and atraumatic.      Right Ear: External ear normal.      Left Ear: External ear normal.      Nose: Nose normal.      Mouth/Throat:      Pharynx: No oropharyngeal exudate.   Eyes:      General: No scleral icterus.        Right eye: No discharge.         Left eye: No discharge.      Conjunctiva/sclera: Conjunctivae normal.      Pupils: Pupils are equal, round, and reactive to light.   Neck:      Musculoskeletal: Normal range of motion and neck supple.      Thyroid: No thyromegaly.      Vascular: No JVD.      Trachea: No tracheal deviation.   Cardiovascular:      Rate and Rhythm: Normal rate and regular rhythm.      Heart sounds: Normal heart sounds. No murmur. No friction rub. No  gallop.    Pulmonary:      Effort: Pulmonary effort is normal.      Breath sounds: Normal breath sounds. No stridor. No wheezing or rales.   Chest:      Chest wall: No tenderness.   Abdominal:      General: Bowel sounds are normal. There is no distension.      Palpations: Abdomen is soft. There is no mass.      Tenderness: There is no abdominal tenderness. There is no guarding or rebound.      Hernia: No hernia is present.   Genitourinary:     Comments: Defer  Exam  Musculoskeletal: Normal range of motion.         General: No tenderness.   Lymphadenopathy:      Cervical: No cervical adenopathy.   Skin:     General: Skin is warm and dry.      Coloration: Skin is not pale.      Findings: No erythema or rash.   Neurological:      Mental Status: She is alert and oriented to person, place, and time.      Cranial Nerves: No cranial nerve deficit.      Motor: No abnormal muscle tone.      Coordination: Coordination normal.      Deep Tendon Reflexes: Reflexes are normal and symmetric. Reflexes normal.   Psychiatric:         Behavior: Behavior normal.         Thought Content: Thought content normal.         Judgment: Judgment normal.         Assessment:       1. Yeast vaginitis        Plan:           Isabelle was seen today for vaginitis.    Diagnoses and all orders for this visit:    Yeast vaginitis  -     fluconazole (DIFLUCAN) 150 MG Tab; Take 1 tablet (150 mg total) by mouth every 72 hours. for 5 days      Yeast vaginitis  -diflucan as directed  -if persisting - need  exam and UA/culture    Spent adequate time in obtaining history and explaining differentials    25 minutes spent during this visit of which greater than 50% devoted to face-face counseling and coordination of care regarding diagnosis and management plan    Follow up if symptoms worsen or fail to improve.

## 2020-08-27 NOTE — PATIENT INSTRUCTIONS
Preventing Vaginal Infection  These steps can help you stay comfortable during treatment of a vaginal infection. They also help prevent vaginal infections in the future.  Keeping a healthy balance  Factors that change the normal balance in the vagina can lead to a vaginal infection. To help keep the balance normal, try these tips:  · Change out of wet bathing suits and damp exercise clothes as soon as possible. Yeast thrive in a warm, moist environment.  · Avoid wearing tight pants. Choose cotton underwear and pantyhose that have a cotton crotch. Cotton keeps you cooler and drier than synthetics.  · Don't douche unless directed by your health care provider. Douching can destroy friendly bacteria and change the vagina's normal balance.  · Wipe from front to back after using the toilet. This prevents bacteria from spreading from the anus to the vulva.  · Wash the vulva with mild, unscented soap or with plain water.  · Wash your diaphragm, spermicide applicators, and sex toys with mild soap and water after use. Dry them thoroughly before putting them away.  · Change tampons often (every 2 hours to 4 hours). Leaving a tampon in for too long may disrupt the balance of vaginal bacteria.  · Avoid vaginal sprays, scented toilet paper and soaps, and deodorant tampons or pads, which can cause vaginal irritation  Staying healthy overall  Good overall health can help you resist infection. To be healthier:  · Help protect yourself from STDs by using latex condoms for intercourse. Ask your health care provider for more information about safer sex.  · Eat a variety of healthy foods.  · Exercise regularly.  · Get enough rest and sleep.  · Maintain a healthy weight. If you need to lose weight, ask your health care provider for advice on how to start.  Date Last Reviewed: 5/18/2015  © 4076-3750 [employer]. [address] [place] [address], [place] [address]. All rights reserved. This information is not intended as a substitute  for professional medical care. Always follow your healthcare professional's instructions.        Vaginal Infection: Yeast (Candidiasis)  Yeast infection occurs when yeast in the vagina increase and attacks the vaginal tissues. Yeast is a type of fungus. These infections are often caused by a type of yeast called Candida albicans. Other species of yeast can also cause infections. Factors that may make infection more likely include recent antibiotic use, douching, or increased sex. Yeast infections are more common in women who have diabetes, or are obese or pregnant, or have a weak immune system.  Symptoms of yeast infection  · Clumpy or thin, white discharge, which may look like cottage cheese  · No odor or minimal odor  · Severe vaginal itching or burning  · Burning with urination  · Swelling, redness of vulva  · Pain during sex  Treating yeast infection  Yeast infection is treated with a vaginal antifungal cream. In some cases, antifungal pills are prescribed instead. During treatment:  · Finish all of your medicine, even if your symptoms go away.  · Apply the cream before going to bed. Lie flat after applying so that it doesn't drip out.  · Do not douche or use tampons.  · Don't rely on a diaphragm or condoms, since the cream may weaken them.  · Avoid intercourse if advised by your healthcare provider.     Should I treat a yeast infection myself?  Discuss with your healthcare provider whether you should use over-the-counter medicines to treat a yeast infection. Self-treatment may depend on whether:  · You've had a yeast infection in the past.  · You're at risk for STDs.  Call your healthcare provider if symptoms do not go away or come back after treatment.   Date Last Reviewed: 3/1/2017  © 1459-0212 [employer]. [address] [place] [address], [place] [address]. All rights reserved. This information is not intended as a substitute for professional medical care. Always follow your healthcare professional's  instructions.        Preventing Vaginitis     Use mild, unscented soap when you bathe or shower to avoid irritating your vagina.    Vaginitis is irritation or infection of the vagina or vulva (the outside opening of the vagina). Vaginitis can be caused by bacteria, viruses, parasites, or yeast. Chemicals (such as in perfumes or soaps or in spermicides) can sometimes be a cause. Vaginitis can be caused by hormone changes in pregnancy or with menopause. You can help prevent vaginitis. Follow the tips below. And see your healthcare provider if you have any symptoms.  Hygiene  · Avoid chemicals. Do not use vaginal sprays. Do not use scented toilet paper or tampons that are scented. Sprays and scents have chemicals that can irritate your vagina.  · Do not douche unless you are told to by your healthcare provider. Douching is rarely needed. And it upsets the normal balance in the vagina.  · Wash yourself well. Wash the outer vaginal area (vulva) every day with mild, unscented soap. Keep it as dry as possible.  · Wipe correctly. Make sure to wipe from front to back after a bowel movement. This helps keep from spreading bacteria from your anus to your vagina.  · Change your tampon often. During your period, make sure to change your tampon as often as directed on the package. This allows the normal flow of vaginal discharge and blood.  Lifestyle  · Limit your number of sexual partners. The more partners you have, the greater your risk of infection. Using condoms helps reduce your risk.  · Get enough sleep. Sleep helps keep your bodys immune system healthy. This helps you fight infection.  · Lose weight, if needed. Excess weight can reduce air circulation around your vagina. This can increase your risk of infection.  · Exercise regularly. Regular activity helps keep your body healthy.  · Take antibiotics only as directed. Antibiotics can change the normal chemical balance in the vagina.    Clothing  · Dont sit in wet  clothes. Yeast thrives when its warm and damp.  · Dont wear tight pants. And dont wear tights, leggings, or hose without a cotton crotch. These types of clothing trap warmth and moisture.  · Wear cotton underwear. Cotton lets air circulate around the vagina.  Symptoms of vaginitis  · Irritation, swelling, or itching of the genital area  · Vaginal discharge  · Bad vaginal odor  · Pain or burning during urination   Date Last Reviewed: 12/1/2016  © 6261-2509 [employer]. [address] [place] [address]. All rights reserved. This information is not intended as a substitute for professional medical care. Always follow your healthcare professional's instructions.

## 2020-09-02 ENCOUNTER — TELEPHONE (OUTPATIENT)
Dept: FAMILY MEDICINE | Facility: CLINIC | Age: 85
End: 2020-09-02

## 2020-09-03 ENCOUNTER — OFFICE VISIT (OUTPATIENT)
Dept: FAMILY MEDICINE | Facility: CLINIC | Age: 85
End: 2020-09-03
Attending: FAMILY MEDICINE
Payer: MEDICARE

## 2020-09-03 VITALS
HEIGHT: 65 IN | TEMPERATURE: 99 F | WEIGHT: 128.5 LBS | HEART RATE: 80 BPM | BODY MASS INDEX: 21.41 KG/M2 | DIASTOLIC BLOOD PRESSURE: 82 MMHG | OXYGEN SATURATION: 98 % | SYSTOLIC BLOOD PRESSURE: 139 MMHG

## 2020-09-03 DIAGNOSIS — N39.0 BACTERIAL UTI: Primary | ICD-10-CM

## 2020-09-03 DIAGNOSIS — A49.9 BACTERIAL UTI: Primary | ICD-10-CM

## 2020-09-03 DIAGNOSIS — B37.31 YEAST VAGINITIS: ICD-10-CM

## 2020-09-03 LAB
BILIRUB UR QL STRIP: NEGATIVE
CLARITY UR REFRACT.AUTO: CLEAR
COLOR UR AUTO: YELLOW
GLUCOSE UR QL STRIP: NEGATIVE
HGB UR QL STRIP: NEGATIVE
KETONES UR QL STRIP: NEGATIVE
LEUKOCYTE ESTERASE UR QL STRIP: NEGATIVE
NITRITE UR QL STRIP: NEGATIVE
PH UR STRIP: 7 [PH] (ref 5–8)
PROT UR QL STRIP: NEGATIVE
SP GR UR STRIP: 1.01 (ref 1–1.03)
URN SPEC COLLECT METH UR: NORMAL

## 2020-09-03 PROCEDURE — 99999 PR PBB SHADOW E&M-EST. PATIENT-LVL IV: CPT | Mod: PBBFAC,,, | Performed by: FAMILY MEDICINE

## 2020-09-03 PROCEDURE — 87088 URINE BACTERIA CULTURE: CPT

## 2020-09-03 PROCEDURE — 99214 OFFICE O/P EST MOD 30 MIN: CPT | Mod: S$PBB,,, | Performed by: FAMILY MEDICINE

## 2020-09-03 PROCEDURE — 99214 PR OFFICE/OUTPT VISIT, EST, LEVL IV, 30-39 MIN: ICD-10-PCS | Mod: S$PBB,,, | Performed by: FAMILY MEDICINE

## 2020-09-03 PROCEDURE — 87661 TRICHOMONAS VAGINALIS AMPLIF: CPT | Mod: 59

## 2020-09-03 PROCEDURE — 87086 URINE CULTURE/COLONY COUNT: CPT

## 2020-09-03 PROCEDURE — 87077 CULTURE AEROBIC IDENTIFY: CPT | Mod: 59

## 2020-09-03 PROCEDURE — 87801 DETECT AGNT MULT DNA AMPLI: CPT

## 2020-09-03 PROCEDURE — 87186 SC STD MICRODIL/AGAR DIL: CPT | Mod: 59

## 2020-09-03 PROCEDURE — 99999 PR PBB SHADOW E&M-EST. PATIENT-LVL IV: ICD-10-PCS | Mod: PBBFAC,,, | Performed by: FAMILY MEDICINE

## 2020-09-03 PROCEDURE — 81003 URINALYSIS AUTO W/O SCOPE: CPT

## 2020-09-03 PROCEDURE — 87481 CANDIDA DNA AMP PROBE: CPT | Mod: 59

## 2020-09-03 PROCEDURE — 99214 OFFICE O/P EST MOD 30 MIN: CPT | Mod: PBBFAC,PO | Performed by: FAMILY MEDICINE

## 2020-09-03 RX ORDER — CLOTRIMAZOLE AND BETAMETHASONE DIPROPIONATE 10; .64 MG/G; MG/G
CREAM TOPICAL 2 TIMES DAILY
Qty: 15 G | Refills: 1 | Status: SHIPPED | OUTPATIENT
Start: 2020-09-03 | End: 2020-09-03 | Stop reason: SDUPTHER

## 2020-09-03 RX ORDER — DOXYCYCLINE 100 MG/1
100 CAPSULE ORAL 2 TIMES DAILY
Qty: 20 CAPSULE | Refills: 0 | Status: SHIPPED | OUTPATIENT
Start: 2020-09-03 | End: 2020-09-28 | Stop reason: SDUPTHER

## 2020-09-03 RX ORDER — CLOTRIMAZOLE AND BETAMETHASONE DIPROPIONATE 10; .64 MG/G; MG/G
CREAM TOPICAL 2 TIMES DAILY
Qty: 15 G | Refills: 1 | Status: SHIPPED | OUTPATIENT
Start: 2020-09-03 | End: 2020-09-04 | Stop reason: SDUPTHER

## 2020-09-03 RX ORDER — DOXYCYCLINE 100 MG/1
100 CAPSULE ORAL 2 TIMES DAILY
Qty: 20 CAPSULE | Refills: 0 | Status: SHIPPED | OUTPATIENT
Start: 2020-09-03 | End: 2020-09-03 | Stop reason: SDUPTHER

## 2020-09-03 NOTE — PROGRESS NOTES
Subjective:       Patient ID: Isabelle Roberts is a 86 y.o. female.    Chief Complaint: Urinary Tract Infection and Vaginitis    86 yr old pleasant white female with recurrent/chronic UTI, anxiety, insomnia, HTN, hypothyroidism, breast CA in remission, glaucoma, presents today for evaluation of UTI symptoms, vaginal discharge and irritation. Onset 7 days ago and gradually worsening. + dysuria but no odor in discharge. Details as follows -        Urinary Tract Infection   This is a new problem. The current episode started in the past 7 days. The problem occurs every urination. The problem has been unchanged. The quality of the pain is described as burning. The pain is at a severity of 3/10. The pain is mild. There has been no fever. She is not sexually active. There is no history of pyelonephritis. Pertinent negatives include no frequency, nausea, vomiting or constipation. There is no history of STD.   Vaginal Discharge  The patient's primary symptoms include vaginal discharge. The patient's pertinent negatives include no genital itching, genital odor, genital rash or pelvic pain. This is a new problem. The current episode started in the past 7 days. The problem occurs constantly. The problem has been gradually worsening. She is not pregnant. Associated symptoms include dysuria. Pertinent negatives include no abdominal pain, back pain, constipation, discolored urine, frequency, headaches, joint pain, joint swelling, nausea, painful intercourse, sore throat or vomiting. The vaginal discharge was copious. She has not been passing clots. The symptoms are aggravated by activity. She has tried nothing for the symptoms. The treatment provided no relief. She is not sexually active. No, her partner does not have an STD. She is postmenopausal. There is no history of an abdominal surgery, a gynecological surgery, herpes simplex, metrorrhagia, ovarian cysts, perineal abscess, an STD or vaginosis.     Review of Systems    Constitutional: Negative.  Negative for activity change, diaphoresis and unexpected weight change.   HENT: Negative.  Negative for nasal congestion, ear discharge, hearing loss, rhinorrhea, sore throat and voice change.    Eyes: Negative.  Negative for pain, discharge and visual disturbance.   Respiratory: Negative.  Negative for chest tightness, shortness of breath and wheezing.    Cardiovascular: Negative.  Negative for chest pain.   Gastrointestinal: Negative.  Negative for abdominal distention, abdominal pain, anal bleeding, constipation, nausea and vomiting.   Endocrine: Negative.  Negative for cold intolerance, polydipsia and polyuria.   Genitourinary: Positive for dysuria and vaginal discharge. Negative for decreased urine volume, difficulty urinating, frequency, menstrual problem, pelvic pain and vaginal pain.   Musculoskeletal: Negative.  Negative for arthralgias, back pain, gait problem, joint pain and myalgias.   Integumentary:  Negative for color change, pallor and wound. Negative.   Allergic/Immunologic: Negative.  Negative for environmental allergies and immunocompromised state.   Neurological: Negative.  Negative for dizziness, tremors, seizures, speech difficulty and headaches.   Hematological: Negative.  Negative for adenopathy. Does not bruise/bleed easily.   Psychiatric/Behavioral: Negative.  Negative for agitation, confusion, decreased concentration, hallucinations, self-injury and suicidal ideas. The patient is not nervous/anxious.          PMH/PSH/FH/SH/MED/ALLERGY reviewed    Objective:       Vitals:    09/03/20 0954   BP: 139/82   Pulse: 80   Temp: 98.5 °F (36.9 °C)       Physical Exam  Constitutional:       General: She is not in acute distress.     Appearance: She is well-developed. She is not diaphoretic.   HENT:      Head: Normocephalic and atraumatic.      Right Ear: External ear normal.      Left Ear: External ear normal.      Nose: Nose normal.      Mouth/Throat:      Pharynx: No  oropharyngeal exudate.   Eyes:      General: No scleral icterus.        Right eye: No discharge.         Left eye: No discharge.      Conjunctiva/sclera: Conjunctivae normal.      Pupils: Pupils are equal, round, and reactive to light.   Neck:      Musculoskeletal: Normal range of motion and neck supple.      Thyroid: No thyromegaly.      Vascular: No JVD.      Trachea: No tracheal deviation.   Cardiovascular:      Rate and Rhythm: Normal rate and regular rhythm.      Heart sounds: Normal heart sounds. No murmur. No friction rub. No gallop.    Pulmonary:      Effort: Pulmonary effort is normal.      Breath sounds: Normal breath sounds. No stridor. No wheezing or rales.   Chest:      Chest wall: No tenderness.   Abdominal:      General: Bowel sounds are normal. There is no distension.      Palpations: Abdomen is soft. There is no mass.      Tenderness: There is no abdominal tenderness. There is no guarding or rebound.      Hernia: No hernia is present.   Genitourinary:     Comments: Defer  Exam  Musculoskeletal: Normal range of motion.         General: No tenderness.   Lymphadenopathy:      Cervical: No cervical adenopathy.   Skin:     General: Skin is warm and dry.      Coloration: Skin is not pale.      Findings: No erythema or rash.   Neurological:      Mental Status: She is alert and oriented to person, place, and time.      Cranial Nerves: No cranial nerve deficit.      Motor: No abnormal muscle tone.      Coordination: Coordination normal.      Deep Tendon Reflexes: Reflexes are normal and symmetric. Reflexes normal.   Psychiatric:         Behavior: Behavior normal.         Thought Content: Thought content normal.         Judgment: Judgment normal.         Assessment:       1. Bacterial UTI    2. Yeast vaginitis        Plan:           Isabelle was seen today for urinary tract infection and vaginitis.    Diagnoses and all orders for this visit:    Bacterial UTI  -     Urinalysis  -     Urine culture  -      Discontinue: doxycycline (MONODOX) 100 MG capsule; Take 1 capsule (100 mg total) by mouth 2 (two) times daily.  -     doxycycline (MONODOX) 100 MG capsule; Take 1 capsule (100 mg total) by mouth 2 (two) times daily.    Yeast vaginitis  -     Vaginosis Screen by DNA Probe  -     Discontinue: clotrimazole-betamethasone 1-0.05% (LOTRISONE) cream; Apply topically 2 (two) times daily.  -     clotrimazole-betamethasone 1-0.05% (LOTRISONE) cream; Apply topically 2 (two) times daily.      Bacterial UTI  -UA n culture  -dozy pending culture    Yeast vaginitis  -lotrisone as directed    Spent adequate time in obtaining history and explaining differentials    25 minutes spent during this visit of which greater than 50% devoted to face-face counseling and coordination of care regarding diagnosis and management plan    RTC prn worsening

## 2020-09-04 LAB
BACTERIAL VAGINOSIS DNA: NEGATIVE
CANDIDA GLABRATA DNA: NEGATIVE
CANDIDA KRUSEI DNA: NEGATIVE
CANDIDA RRNA VAG QL PROBE: NEGATIVE
T VAGINALIS RRNA GENITAL QL PROBE: NEGATIVE

## 2020-09-05 LAB
BACTERIA UR CULT: ABNORMAL
BACTERIA UR CULT: ABNORMAL

## 2020-09-08 ENCOUNTER — TELEPHONE (OUTPATIENT)
Dept: OBSTETRICS AND GYNECOLOGY | Facility: CLINIC | Age: 85
End: 2020-09-08

## 2020-09-08 ENCOUNTER — TELEPHONE (OUTPATIENT)
Dept: FAMILY MEDICINE | Facility: CLINIC | Age: 85
End: 2020-09-08

## 2020-09-08 NOTE — TELEPHONE ENCOUNTER
----- Message from Fatimah Garces sent at 9/8/2020 11:22 AM CDT -----  Regarding: Urine results  Pt calling to speak with the nurse concerning her urine sample results.    Pt can be reached at [phone].      Thank you!

## 2020-09-08 NOTE — TELEPHONE ENCOUNTER
----- Message from Navya Shin sent at 9/8/2020  8:18 AM CDT -----  Regarding: Results  Contact: Self/ [phone]  Patient would like to get test results from urine sample. Please call and advise.

## 2020-09-08 NOTE — TELEPHONE ENCOUNTER
New Pt thinks she has a yeast infection.  C/o itching and irritation.  She used OTC Monistat without relief.  She also reports urine stream is smaller/thinner.  She has seen PCP but thinks she needs to see OBGYN.  Scheduled Friday.

## 2020-09-09 ENCOUNTER — TELEPHONE (OUTPATIENT)
Dept: FAMILY MEDICINE | Facility: CLINIC | Age: 85
End: 2020-09-09

## 2020-09-09 NOTE — TELEPHONE ENCOUNTER
Spoke to pt and stated she would like to know if she should continue taking the Diflucan. Pls advise.

## 2020-09-09 NOTE — TELEPHONE ENCOUNTER
Spoke to pt and she don't have to take the Diflucan, just the Doxycycline. Pt verbalized understanding.

## 2020-09-09 NOTE — TELEPHONE ENCOUNTER
----- Message from Dk London sent at 9/9/2020  8:30 AM CDT -----  Regarding: Medication  Type:  Needs Medical Advice    Who Called:  patient  Would the patient rather a call back or a response via [facility]?  Call back  Best Call Back Number:  [phone]  Additional Information:  wants to speak with the doctor or nurse in regard to some medication she forgot to ask about taking

## 2020-09-11 ENCOUNTER — PATIENT OUTREACH (OUTPATIENT)
Dept: ADMINISTRATIVE | Facility: HOSPITAL | Age: 85
End: 2020-09-11

## 2020-09-11 ENCOUNTER — OFFICE VISIT (OUTPATIENT)
Dept: OBSTETRICS AND GYNECOLOGY | Facility: CLINIC | Age: 85
End: 2020-09-11
Attending: OBSTETRICS & GYNECOLOGY
Payer: MEDICARE

## 2020-09-11 VITALS
BODY MASS INDEX: 21.29 KG/M2 | HEIGHT: 65 IN | SYSTOLIC BLOOD PRESSURE: 116 MMHG | WEIGHT: 127.75 LBS | DIASTOLIC BLOOD PRESSURE: 72 MMHG

## 2020-09-11 DIAGNOSIS — B37.31 YEAST VAGINITIS: ICD-10-CM

## 2020-09-11 PROCEDURE — 99203 PR OFFICE/OUTPT VISIT, NEW, LEVL III, 30-44 MIN: ICD-10-PCS | Mod: S$PBB,,, | Performed by: OBSTETRICS & GYNECOLOGY

## 2020-09-11 PROCEDURE — 99999 PR PBB SHADOW E&M-EST. PATIENT-LVL III: ICD-10-PCS | Mod: PBBFAC,,, | Performed by: OBSTETRICS & GYNECOLOGY

## 2020-09-11 PROCEDURE — 99203 OFFICE O/P NEW LOW 30 MIN: CPT | Mod: S$PBB,,, | Performed by: OBSTETRICS & GYNECOLOGY

## 2020-09-11 PROCEDURE — 99999 PR PBB SHADOW E&M-EST. PATIENT-LVL III: CPT | Mod: PBBFAC,,, | Performed by: OBSTETRICS & GYNECOLOGY

## 2020-09-11 PROCEDURE — 99213 OFFICE O/P EST LOW 20 MIN: CPT | Mod: PBBFAC,PN | Performed by: OBSTETRICS & GYNECOLOGY

## 2020-09-11 RX ORDER — HYDROCORTISONE ACETATE PRAMOXINE HCL 2.5; 1 G/100G; G/100G
CREAM TOPICAL 3 TIMES DAILY
Qty: 30 G | Refills: 0 | Status: SHIPPED | OUTPATIENT
Start: 2020-09-11

## 2020-09-11 RX ORDER — CLOTRIMAZOLE AND BETAMETHASONE DIPROPIONATE 10; .64 MG/G; MG/G
CREAM TOPICAL 2 TIMES DAILY
Qty: 15 G | Refills: 1 | Status: SHIPPED | OUTPATIENT
Start: 2020-09-11

## 2020-09-11 NOTE — PROGRESS NOTES
Subjective:       Patient ID: Isabelle Roberts is a 86 y.o. female.    Chief Complaint:  Vaginal Itching (c/o vagainal itching externally- currently using Lotrisone cream which has helped )      History of Present Illness  - patient presents with c/o external itching. Needs another Rx for Lotrisone cream as that has helped. Is also having itching from hemorrhoids. Is on antibiotics for UTI and feels much better.  - patient sees her PCP regularly. She declines to have mammograms due to advanced age.    Past Medical History:   Diagnosis Date    Allergy     Anxiety     Arthritis     Breast cancer     Cancer     Glaucoma     Glucose intolerance (impaired glucose tolerance)     History of blood transfusion     Hyperlipidemia     Hypertension     Hypothyroidism     Insomnia     Raynaud's disease     Recurrent cystitis     Thyroid disease        Past Surgical History:   Procedure Laterality Date    APPENDECTOMY      BREAST LUMPECTOMY      HYSTERECTOMY      TUBAL LIGATION           Current Outpatient Medications:     amLODIPine (NORVASC) 10 MG tablet, Take 1 tablet (10 mg total) by mouth once daily., Disp: 90 tablet, Rfl: 3    benazepriL (LOTENSIN) 40 MG tablet, Take 1 tablet (40 mg total) by mouth once daily., Disp: 90 tablet, Rfl: 3    clotrimazole-betamethasone 1-0.05% (LOTRISONE) cream, Apply topically 2 (two) times daily., Disp: 15 g, Rfl: 1    dorzolamide-timolol 2-0.5% (COSOPT) 22.3-6.8 mg/mL ophthalmic solution, Place 1 drop into both eyes 2 (two) times daily. , Disp: , Rfl:     doxycycline (MONODOX) 100 MG capsule, Take 1 capsule (100 mg total) by mouth 2 (two) times daily., Disp: 20 capsule, Rfl: 0    LORazepam (ATIVAN) 1 MG tablet, Take 1 tablet (1 mg total) by mouth every evening., Disp: 90 tablet, Rfl: 3    LUMIGAN 0.01 % Drop, Place 1 drop into the right eye every evening. , Disp: , Rfl:     mirtazapine (REMERON) 15 MG tablet, TAKE 1 TABLET (15 MG TOTAL) BY MOUTH AT BEDTIME, Disp: 90  tablet, Rfl: 3    SYNTHROID 75 mcg tablet, Take 1 tablet (75 mcg total) by mouth before breakfast., Disp: 90 tablet, Rfl: 3    ZIOPTAN, PF, 0.0015 % Dpet, INSTILL 1 DROP INTO EACH EYE ONCE DAILY, Disp: , Rfl: 6    zolpidem (AMBIEN) 10 mg Tab, TAKE 1 TABLET BY MOUTH ONCE DAILY IN THE EVENING AS NEEDED, Disp: 90 tablet, Rfl: 0    hydrocortisone-pramoxine (ANALPRAM-HC) 2.5-1 % Crea, Place rectally 3 (three) times daily., Disp: 30 g, Rfl: 0    Review of patient's allergies indicates:   Allergen Reactions    Sunscreen Other (See Comments)     redness    Sulfa (sulfonamide antibiotics) Rash       GYN & OB History  No LMP recorded. Patient has had a hysterectomy.   Date of Last Pap: No result found    OB History    Para Term  AB Living   3 3 3         SAB TAB Ectopic Multiple Live Births                  # Outcome Date GA Lbr Franky/2nd Weight Sex Delivery Anes PTL Lv   3 Term            2 Term            1 Term                Social History     Socioeconomic History    Marital status:      Spouse name: Not on file    Number of children: Not on file    Years of education: Not on file    Highest education level: Not on file   Occupational History     Employer: Retired   Social Needs    Financial resource strain: Not on file    Food insecurity     Worry: Not on file     Inability: Not on file    Transportation needs     Medical: Not on file     Non-medical: Not on file   Tobacco Use    Smoking status: Former Smoker     Packs/day: 0.50     Years: 10.00     Pack years: 5.00     Types: Cigarettes     Quit date:      Years since quittin.7    Smokeless tobacco: Never Used   Substance and Sexual Activity    Alcohol use: Yes    Drug use: No    Sexual activity: Not on file   Lifestyle    Physical activity     Days per week: Not on file     Minutes per session: Not on file    Stress: Not on file   Relationships    Social connections     Talks on phone: Not on file     Gets together: Not  "on file     Attends Roman Catholic service: Not on file     Active member of club or organization: Not on file     Attends meetings of clubs or organizations: Not on file     Relationship status: Not on file   Other Topics Concern    Not on file   Social History Narrative    Not on file       Family History   Problem Relation Age of Onset    Asthma Son     Allergies Son     Allergic rhinitis Son     Asthma Grandchild     Allergies Grandchild     Allergic rhinitis Grandchild     Breast cancer Paternal Cousin     Angioedema Neg Hx     Eczema Neg Hx     Immunodeficiency Neg Hx        Review of Systems  Review of Systems   Respiratory: Negative for shortness of breath.    Cardiovascular: Negative for chest pain and palpitations.   Gastrointestinal: Negative for blood in stool, nausea and vomiting.   Genitourinary:        - see HPI   Skin: Negative for rash and wound.   Allergic/Immunologic: Negative for immunocompromised state.   Neurological: Negative for dizziness and syncope.   Hematological: Negative for adenopathy.   Psychiatric/Behavioral: Negative for behavioral problems.        Objective:     Vitals:    09/11/20 1315   BP: 116/72   Weight: 58 kg (127 lb 12.1 oz)   Height: 5' 5" (1.651 m)       Physical Exam:   Constitutional: She appears well-developed and well-nourished. She is cooperative. No distress.             Abdominal: Soft. Normal appearance. There is no abdominal tenderness.     Genitourinary:    Vagina normal.   Rectum:      External hemorrhoid present.   There is no rash, tenderness or lesion on the right labia. There is no rash, tenderness or lesion on the left labia. Uterus is absent. Right adnexum displays no mass, no tenderness and no fullness. Left adnexum displays no mass, no tenderness and no fullness. Cervix exhibits absence.    Genitourinary Comments: Atrophic. No increased discharge. No s/s infection or excoriations.                 Neurological: She is alert.          Assessment/ " Plan:     Orders Placed This Encounter    hydrocortisone-pramoxine (ANALPRAM-HC) 2.5-1 % Crea    clotrimazole-betamethasone 1-0.05% (LOTRISONE) cream       Isabelle was seen today for vaginal itching.    Diagnoses and all orders for this visit:    Yeast vaginitis  -     clotrimazole-betamethasone 1-0.05% (LOTRISONE) cream; Apply topically 2 (two) times daily.    Other orders  -     hydrocortisone-pramoxine (ANALPRAM-HC) 2.5-1 % Crea; Place rectally 3 (three) times daily.    - patient will call with any issues.    Follow up for annual exam.

## 2020-09-11 NOTE — PROGRESS NOTES
Scanned updated immunization forms from [facility] in .   Updated immunizations.   Deleted dead orders.

## 2020-09-28 ENCOUNTER — TELEPHONE (OUTPATIENT)
Dept: FAMILY MEDICINE | Facility: CLINIC | Age: 85
End: 2020-09-28

## 2020-09-28 DIAGNOSIS — A49.9 BACTERIAL UTI: ICD-10-CM

## 2020-09-28 DIAGNOSIS — N39.0 BACTERIAL UTI: ICD-10-CM

## 2020-09-28 RX ORDER — DOXYCYCLINE 100 MG/1
100 CAPSULE ORAL 2 TIMES DAILY
Qty: 20 CAPSULE | Refills: 0 | Status: SHIPPED | OUTPATIENT
Start: 2020-09-28

## 2020-09-28 NOTE — TELEPHONE ENCOUNTER
Spoke to pt and states that she took all the Doxycycline 100mg BID for her UTI. Pt stated that the UTI went away, but has come back and pt is requesting another round of antibiotics. Pls advise.

## 2020-09-28 NOTE — TELEPHONE ENCOUNTER
----- Message from Laine Irving sent at 9/28/2020  8:16 AM CDT -----  Regarding: pt advice  Contact: pt  Pt called in regards to possible kidney infection and would like to see if you can prescribe her something for this    Please advise pt    Pt can be reached at [phone]

## 2020-10-12 ENCOUNTER — TELEPHONE (OUTPATIENT)
Dept: OBSTETRICS AND GYNECOLOGY | Facility: CLINIC | Age: 85
End: 2020-10-12

## 2020-10-12 NOTE — TELEPHONE ENCOUNTER
Pt c/o change in urine stream, hard to eliminate but denies pain or burning. States that prior to seeing Dr. Mejia on 9/11/20, Dr. Bernal had treated her twice for a UTI with the same symptoms. Offered appt for pt tomorrow at [facility] but pt only wants to see Dr. Mejia at [place].

## 2020-10-12 NOTE — TELEPHONE ENCOUNTER
----- Message from Selin Dan sent at 10/12/2020 10:47 AM CDT -----  Type:  Sooner Apoointment Request    Caller is requesting a sooner appointment.  Caller declined first available appointment listed below.  Caller will not accept being placed on the waitlist and is requesting a message be sent to doctor.  Name of Caller:Patient  When is the first available appointment?12/11  Symptoms:   Would the patient rather a call back or a response via [facility]?    Best Call Back Number: [phone]  Additional Information:

## 2020-10-16 ENCOUNTER — OFFICE VISIT (OUTPATIENT)
Dept: OBSTETRICS AND GYNECOLOGY | Facility: CLINIC | Age: 85
End: 2020-10-16
Attending: OBSTETRICS & GYNECOLOGY
Payer: MEDICARE

## 2020-10-16 VITALS — WEIGHT: 124.69 LBS | BODY MASS INDEX: 20.78 KG/M2 | HEIGHT: 65 IN

## 2020-10-16 DIAGNOSIS — R39.15 URINARY URGENCY: Primary | ICD-10-CM

## 2020-10-16 PROCEDURE — 99999 PR PBB SHADOW E&M-EST. PATIENT-LVL III: CPT | Mod: PBBFAC,,, | Performed by: OBSTETRICS & GYNECOLOGY

## 2020-10-16 PROCEDURE — 99213 OFFICE O/P EST LOW 20 MIN: CPT | Mod: S$PBB,,, | Performed by: OBSTETRICS & GYNECOLOGY

## 2020-10-16 PROCEDURE — 99999 PR PBB SHADOW E&M-EST. PATIENT-LVL III: ICD-10-PCS | Mod: PBBFAC,,, | Performed by: OBSTETRICS & GYNECOLOGY

## 2020-10-16 PROCEDURE — 87086 URINE CULTURE/COLONY COUNT: CPT

## 2020-10-16 PROCEDURE — 99213 PR OFFICE/OUTPT VISIT, EST, LEVL III, 20-29 MIN: ICD-10-PCS | Mod: S$PBB,,, | Performed by: OBSTETRICS & GYNECOLOGY

## 2020-10-16 PROCEDURE — 99213 OFFICE O/P EST LOW 20 MIN: CPT | Mod: PBBFAC,PN | Performed by: OBSTETRICS & GYNECOLOGY

## 2020-10-16 RX ORDER — NITROFURANTOIN 25; 75 MG/1; MG/1
100 CAPSULE ORAL 2 TIMES DAILY
Qty: 14 CAPSULE | Refills: 0 | Status: SHIPPED | OUTPATIENT
Start: 2020-10-16 | End: 2020-10-23

## 2020-10-16 NOTE — PROGRESS NOTES
Subjective:       Patient ID: Isabelle Roberts is a 86 y.o. female.    Chief Complaint:  Urinary Frequency (urgency , pt had kydney inf was treated with doxycycline  but having urgency and frequency )      History of Present Illness  - patient presents with urinary urgency and frequency for the last few days. Feels like her stream has decreased. She feels like she goes often but not a lot comes out. She had these symptoms last month and had a UTI treated with doxycyline. The symptoms completely resolved. She denies fever/chills.    Past Medical History:   Diagnosis Date    Allergy     Anxiety     Arthritis     Breast cancer     Cancer     Glaucoma     Glucose intolerance (impaired glucose tolerance)     History of blood transfusion     Hyperlipidemia     Hypertension     Hypothyroidism     Insomnia     Raynaud's disease     Recurrent cystitis     Thyroid disease        Past Surgical History:   Procedure Laterality Date    APPENDECTOMY      BREAST LUMPECTOMY      HYSTERECTOMY      TUBAL LIGATION           Current Outpatient Medications:     amLODIPine (NORVASC) 10 MG tablet, Take 1 tablet (10 mg total) by mouth once daily., Disp: 90 tablet, Rfl: 3    benazepriL (LOTENSIN) 40 MG tablet, Take 1 tablet (40 mg total) by mouth once daily., Disp: 90 tablet, Rfl: 3    dorzolamide-timolol 2-0.5% (COSOPT) 22.3-6.8 mg/mL ophthalmic solution, Place 1 drop into both eyes 2 (two) times daily. , Disp: , Rfl:     LORazepam (ATIVAN) 1 MG tablet, Take 1 tablet (1 mg total) by mouth every evening., Disp: 90 tablet, Rfl: 3    LUMIGAN 0.01 % Drop, Place 1 drop into the right eye every evening. , Disp: , Rfl:     mirtazapine (REMERON) 15 MG tablet, TAKE 1 TABLET (15 MG TOTAL) BY MOUTH AT BEDTIME, Disp: 90 tablet, Rfl: 3    SYNTHROID 75 mcg tablet, Take 1 tablet (75 mcg total) by mouth before breakfast., Disp: 90 tablet, Rfl: 3    ZIOPTAN, PF, 0.0015 % Dpet, INSTILL 1 DROP INTO EACH EYE ONCE DAILY, Disp: , Rfl: 6     zolpidem (AMBIEN) 10 mg Tab, TAKE 1 TABLET BY MOUTH ONCE DAILY IN THE EVENING AS NEEDED, Disp: 90 tablet, Rfl: 0    clotrimazole-betamethasone 1-0.05% (LOTRISONE) cream, Apply topically 2 (two) times daily. (Patient not taking: Reported on 10/16/2020), Disp: 15 g, Rfl: 1    doxycycline (MONODOX) 100 MG capsule, Take 1 capsule (100 mg total) by mouth 2 (two) times daily. (Patient not taking: Reported on 10/16/2020), Disp: 20 capsule, Rfl: 0    hydrocortisone-pramoxine (ANALPRAM-HC) 2.5-1 % Crea, Place rectally 3 (three) times daily. (Patient not taking: Reported on 10/16/2020), Disp: 30 g, Rfl: 0    nitrofurantoin, macrocrystal-monohydrate, (MACROBID) 100 MG capsule, Take 1 capsule (100 mg total) by mouth 2 (two) times daily. for 7 days, Disp: 14 capsule, Rfl: 0    Review of patient's allergies indicates:   Allergen Reactions    Sunscreen Other (See Comments)     redness    Sulfa (sulfonamide antibiotics) Rash       GYN & OB History  No LMP recorded. Patient has had a hysterectomy.   Date of Last Pap: No result found    OB History    Para Term  AB Living   3 3 3         SAB TAB Ectopic Multiple Live Births                  # Outcome Date GA Lbr Franky/2nd Weight Sex Delivery Anes PTL Lv   3 Term            2 Term            1 Term                Social History     Socioeconomic History    Marital status:      Spouse name: Not on file    Number of children: Not on file    Years of education: Not on file    Highest education level: Not on file   Occupational History     Employer: Retired   Social Needs    Financial resource strain: Not on file    Food insecurity     Worry: Not on file     Inability: Not on file    Transportation needs     Medical: Not on file     Non-medical: Not on file   Tobacco Use    Smoking status: Former Smoker     Packs/day: 0.50     Years: 10.00     Pack years: 5.00     Types: Cigarettes     Quit date:      Years since quittin.8    Smokeless tobacco:  "Never Used   Substance and Sexual Activity    Alcohol use: Yes    Drug use: No    Sexual activity: Not on file   Lifestyle    Physical activity     Days per week: Not on file     Minutes per session: Not on file    Stress: Not on file   Relationships    Social connections     Talks on phone: Not on file     Gets together: Not on file     Attends Moravian service: Not on file     Active member of club or organization: Not on file     Attends meetings of clubs or organizations: Not on file     Relationship status: Not on file   Other Topics Concern    Not on file   Social History Narrative    Not on file       Family History   Problem Relation Age of Onset    Asthma Son     Allergies Son     Allergic rhinitis Son     Asthma Grandchild     Allergies Grandchild     Allergic rhinitis Grandchild     Breast cancer Paternal Cousin     Angioedema Neg Hx     Eczema Neg Hx     Immunodeficiency Neg Hx        Review of Systems  Review of Systems   All other systems reviewed and are negative.       Objective:     Vitals:    10/16/20 1041   Weight: 56.5 kg (124 lb 10.7 oz)   Height: 5' 5" (1.651 m)       Physical Exam:   Constitutional: She appears well-developed and well-nourished. She is cooperative. No distress.                           Neurological: She is alert.          Assessment/ Plan:     Orders Placed This Encounter    Urine culture    POCT urine dipstick without microscope    nitrofurantoin, macrocrystal-monohydrate, (MACROBID) 100 MG capsule       Isabelle was seen today for urinary frequency.    Diagnoses and all orders for this visit:    Urinary urgency  -     Urine culture  -     POCT urine dipstick without microscope    Other orders  -     nitrofurantoin, macrocrystal-monohydrate, (MACROBID) 100 MG capsule; Take 1 capsule (100 mg total) by mouth 2 (two) times daily. for 7 days    - u/a is negative. Given patient's symptoms, will start empiric treatment with macrobid (as her previous culture was " sensitive to it) and send culture.    Follow up for annual exam.

## 2020-10-18 LAB
BACTERIA UR CULT: NORMAL
BACTERIA UR CULT: NORMAL

## 2020-10-19 ENCOUNTER — TELEPHONE (OUTPATIENT)
Dept: OBSTETRICS AND GYNECOLOGY | Facility: CLINIC | Age: 85
End: 2020-10-19

## 2020-10-19 DIAGNOSIS — R10.2 PELVIC PAIN IN FEMALE: Primary | ICD-10-CM

## 2020-10-19 RX ORDER — HYDROCODONE BITARTRATE AND ACETAMINOPHEN 5; 325 MG/1; MG/1
1 TABLET ORAL EVERY 12 HOURS PRN
Qty: 5 TABLET | Refills: 0 | Status: SHIPPED | OUTPATIENT
Start: 2020-10-19 | End: 2020-11-02

## 2020-10-19 NOTE — TELEPHONE ENCOUNTER
----- Message from Ailyn Henriquez MA sent at 10/19/2020 11:42 AM CDT -----    ----- Message -----  From: Katt Bustamante  Sent: 10/19/2020  11:21 AM CDT  To: Roberto BAIRD Staff    Type:  Needs Medical Advice    Who Called: self  Reason:Patient is having pain due to possible UTI that you tested for and she was wondering if you could prescribe her something for the pain. Also are her results in yet?  Would the patient rather a call back or a response via [facility]? call  Best Call Back Number: [phone]  Additional Information: [facility] [address] [facility] [phone] (Phone)  [phone] (Fax)

## 2020-10-19 NOTE — TELEPHONE ENCOUNTER
She hasn't taken narcotics recently, took Vicodin and Percocet in her 50s after surgery.  She doesn't think Motrin is responding to this pain very well.

## 2020-10-19 NOTE — TELEPHONE ENCOUNTER
Spoke with patient. Having pain only at night. Isn't used to having any pain. Offered to see patient sooner; she declines to come to [facility] to see me. Discussed need for emergency visit if pain worsens or becomes persistent. Discussed negative urine culture. Sent in 5 Percocet tablets. Patient will try 1/2 tab at night. She'll call back or go to ER prn.

## 2020-10-19 NOTE — TELEPHONE ENCOUNTER
"Pt c/o pain in lower abdomen that "radiates up".  Still having urinary symptoms but culture is negative.  Requesting pain medication.  She still has ovaries so recommended US.  Scheduled US and appt with Dr. Mejia Thursday.  Only wants to be seen in [place].       She has been taking Motrin 400mg during the day but the pain is worse at night, requesting something a little stronger.  She had difficulty sleeping last night due to the pain but it improved after using a heating pad.    "

## 2020-10-20 ENCOUNTER — TELEPHONE (OUTPATIENT)
Dept: OBSTETRICS AND GYNECOLOGY | Facility: CLINIC | Age: 85
End: 2020-10-20

## 2020-10-20 NOTE — TELEPHONE ENCOUNTER
"Spoke with patient. Took one Percocet when it was "bothering" her last night and rested well. Having no trouble today. Will see me Thursday as planned unless she needs to be seen before then at urgent care or ED. Verbalized understanding.  "

## 2020-10-22 ENCOUNTER — OFFICE VISIT (OUTPATIENT)
Dept: OBSTETRICS AND GYNECOLOGY | Facility: CLINIC | Age: 85
End: 2020-10-22
Attending: OBSTETRICS & GYNECOLOGY
Payer: MEDICARE

## 2020-10-22 ENCOUNTER — APPOINTMENT (OUTPATIENT)
Dept: RADIOLOGY | Facility: CLINIC | Age: 85
End: 2020-10-22
Attending: OBSTETRICS & GYNECOLOGY
Payer: MEDICARE

## 2020-10-22 VITALS
DIASTOLIC BLOOD PRESSURE: 70 MMHG | HEIGHT: 65 IN | SYSTOLIC BLOOD PRESSURE: 120 MMHG | WEIGHT: 123.44 LBS | BODY MASS INDEX: 20.57 KG/M2

## 2020-10-22 DIAGNOSIS — R18.8 PELVIC FLUID COLLECTION: Primary | ICD-10-CM

## 2020-10-22 DIAGNOSIS — R10.2 PELVIC PAIN IN FEMALE: ICD-10-CM

## 2020-10-22 DIAGNOSIS — R73.02 GLUCOSE INTOLERANCE (IMPAIRED GLUCOSE TOLERANCE): ICD-10-CM

## 2020-10-22 DIAGNOSIS — R19.09 OTHER INTRA-ABDOMINAL AND PELVIC SWELLING, MASS AND LUMP: ICD-10-CM

## 2020-10-22 PROCEDURE — 99999 PR PBB SHADOW E&M-EST. PATIENT-LVL IV: CPT | Mod: PBBFAC,,, | Performed by: OBSTETRICS & GYNECOLOGY

## 2020-10-22 PROCEDURE — 76830 US PELVIS COMP WITH TRANSVAG NON-OB (XPD): ICD-10-PCS | Mod: 26,,, | Performed by: RADIOLOGY

## 2020-10-22 PROCEDURE — 99214 OFFICE O/P EST MOD 30 MIN: CPT | Mod: PBBFAC,PN | Performed by: OBSTETRICS & GYNECOLOGY

## 2020-10-22 PROCEDURE — 76856 US EXAM PELVIC COMPLETE: CPT | Mod: 26,,, | Performed by: RADIOLOGY

## 2020-10-22 PROCEDURE — 99213 OFFICE O/P EST LOW 20 MIN: CPT | Mod: S$PBB,,, | Performed by: OBSTETRICS & GYNECOLOGY

## 2020-10-22 PROCEDURE — 99213 PR OFFICE/OUTPT VISIT, EST, LEVL III, 20-29 MIN: ICD-10-PCS | Mod: S$PBB,,, | Performed by: OBSTETRICS & GYNECOLOGY

## 2020-10-22 PROCEDURE — 76856 US PELVIS COMP WITH TRANSVAG NON-OB (XPD): ICD-10-PCS | Mod: 26,,, | Performed by: RADIOLOGY

## 2020-10-22 PROCEDURE — 76830 TRANSVAGINAL US NON-OB: CPT | Mod: 26,,, | Performed by: RADIOLOGY

## 2020-10-22 PROCEDURE — 99999 PR PBB SHADOW E&M-EST. PATIENT-LVL IV: ICD-10-PCS | Mod: PBBFAC,,, | Performed by: OBSTETRICS & GYNECOLOGY

## 2020-10-23 ENCOUNTER — HOSPITAL ENCOUNTER (OUTPATIENT)
Dept: RADIOLOGY | Facility: OTHER | Age: 85
Discharge: HOME | End: 2020-10-23
Attending: OBSTETRICS & GYNECOLOGY
Payer: MEDICARE

## 2020-10-23 ENCOUNTER — TELEPHONE (OUTPATIENT)
Dept: GYNECOLOGIC ONCOLOGY | Facility: CLINIC | Age: 85
End: 2020-10-23

## 2020-10-23 DIAGNOSIS — R18.8 OTHER ASCITES: ICD-10-CM

## 2020-10-23 PROCEDURE — 74177 CT ABD & PELVIS W/CONTRAST: CPT | Mod: 26,,, | Performed by: RADIOLOGY

## 2020-10-23 PROCEDURE — 74177 CT ABD & PELVIS W/CONTRAST: CPT | Mod: TC

## 2020-10-23 PROCEDURE — 74177 CT ABDOMEN PELVIS WITH CONTRAST: ICD-10-PCS | Mod: 26,,, | Performed by: RADIOLOGY

## 2020-10-23 PROCEDURE — 25500020 PHARM REV CODE 255: Performed by: OBSTETRICS & GYNECOLOGY

## 2020-10-23 PROCEDURE — A9698 NON-RAD CONTRAST MATERIALNOC: HCPCS | Performed by: OBSTETRICS & GYNECOLOGY

## 2020-10-23 RX ADMIN — IOHEXOL 1000 ML: 9 SOLUTION ORAL at 02:10

## 2020-10-23 RX ADMIN — IOHEXOL 75 ML: 350 INJECTION, SOLUTION INTRAVENOUS at 03:10

## 2020-10-23 NOTE — TELEPHONE ENCOUNTER
Left voice that Dr. Cintron can see the patient on  Monday the 26th at 1:30pm also left our office number to call back if needed. Will try again ----- Message from Andrzej Cooper, SUHAS sent at 10/23/2020  2:37 PM CDT -----    ----- Message -----  From: Favio Cintron MD  Sent: 10/23/2020   2:34 PM CDT  To: Stephen Russ MA, Andrzej Cooper, SUHAS, #    Thanks Chayito.  Let's try to get her in Monday please.  JE  ----- Message -----  From: Ania Mejia MD  Sent: 10/23/2020  12:41 PM CDT  To: Favio Cintron MD, Abdulaziz Stahl Staff    Spoke with Dr. Cintron re: patient. Order placed for CT of abd/pelvis. She's going today at 1:30 for CT at [facility].

## 2020-10-23 NOTE — PROGRESS NOTES
Subjective:       Patient ID: Isabelle Roberts is a 86 y.o. female.    Chief Complaint:  Follow-up ( U/s hysterectomy )      History of Present Illness  - patient presents for u/s and f/u due to continued pain at night despite negative urine culture. She took Percocet 2 nights ago with some relief. She reports that now she's feeling better.  - patient reports a feeling of fullness even when doesn't eat much.    Past Medical History:   Diagnosis Date    Allergy     Anxiety     Arthritis     Breast cancer     Cancer     Glaucoma     Glucose intolerance (impaired glucose tolerance)     History of blood transfusion     Hyperlipidemia     Hypertension     Hypothyroidism     Insomnia     Raynaud's disease     Recurrent cystitis     Thyroid disease        Past Surgical History:   Procedure Laterality Date    APPENDECTOMY      BREAST LUMPECTOMY      HYSTERECTOMY      TUBAL LIGATION           Current Outpatient Medications:     amLODIPine (NORVASC) 10 MG tablet, Take 1 tablet (10 mg total) by mouth once daily., Disp: 90 tablet, Rfl: 3    benazepriL (LOTENSIN) 40 MG tablet, Take 1 tablet (40 mg total) by mouth once daily., Disp: 90 tablet, Rfl: 3    clotrimazole-betamethasone 1-0.05% (LOTRISONE) cream, Apply topically 2 (two) times daily., Disp: 15 g, Rfl: 1    dorzolamide-timolol 2-0.5% (COSOPT) 22.3-6.8 mg/mL ophthalmic solution, Place 1 drop into both eyes 2 (two) times daily. , Disp: , Rfl:     doxycycline (MONODOX) 100 MG capsule, Take 1 capsule (100 mg total) by mouth 2 (two) times daily., Disp: 20 capsule, Rfl: 0    HYDROcodone-acetaminophen (NORCO) 5-325 mg per tablet, Take 1 tablet by mouth every 12 (twelve) hours as needed for Pain., Disp: 5 tablet, Rfl: 0    hydrocortisone-pramoxine (ANALPRAM-HC) 2.5-1 % Crea, Place rectally 3 (three) times daily., Disp: 30 g, Rfl: 0    LORazepam (ATIVAN) 1 MG tablet, Take 1 tablet (1 mg total) by mouth every evening., Disp: 90 tablet, Rfl: 3    LUMIGAN  0.01 % Drop, Place 1 drop into the right eye every evening. , Disp: , Rfl:     mirtazapine (REMERON) 15 MG tablet, TAKE 1 TABLET (15 MG TOTAL) BY MOUTH AT BEDTIME, Disp: 90 tablet, Rfl: 3    nitrofurantoin, macrocrystal-monohydrate, (MACROBID) 100 MG capsule, Take 1 capsule (100 mg total) by mouth 2 (two) times daily. for 7 days, Disp: 14 capsule, Rfl: 0    SYNTHROID 75 mcg tablet, Take 1 tablet (75 mcg total) by mouth before breakfast., Disp: 90 tablet, Rfl: 3    ZIOPTAN, PF, 0.0015 % Dpet, INSTILL 1 DROP INTO EACH EYE ONCE DAILY, Disp: , Rfl: 6    zolpidem (AMBIEN) 10 mg Tab, TAKE 1 TABLET BY MOUTH ONCE DAILY IN THE EVENING AS NEEDED, Disp: 90 tablet, Rfl: 0    Review of patient's allergies indicates:   Allergen Reactions    Sunscreen Other (See Comments)     redness    Sulfa (sulfonamide antibiotics) Rash       GYN & OB History  No LMP recorded. Patient has had a hysterectomy.   Date of Last Pap: No result found    OB History    Para Term  AB Living   3 3 3         SAB TAB Ectopic Multiple Live Births                  # Outcome Date GA Lbr Franky/2nd Weight Sex Delivery Anes PTL Lv   3 Term            2 Term            1 Term                Social History     Socioeconomic History    Marital status:      Spouse name: Not on file    Number of children: Not on file    Years of education: Not on file    Highest education level: Not on file   Occupational History     Employer: Retired   Social Needs    Financial resource strain: Not on file    Food insecurity     Worry: Not on file     Inability: Not on file    Transportation needs     Medical: Not on file     Non-medical: Not on file   Tobacco Use    Smoking status: Former Smoker     Packs/day: 0.50     Years: 10.00     Pack years: 5.00     Types: Cigarettes     Quit date:      Years since quittin.8    Smokeless tobacco: Never Used   Substance and Sexual Activity    Alcohol use: Yes    Drug use: No    Sexual activity:  "Not on file   Lifestyle    Physical activity     Days per week: Not on file     Minutes per session: Not on file    Stress: Not on file   Relationships    Social connections     Talks on phone: Not on file     Gets together: Not on file     Attends Anabaptism service: Not on file     Active member of club or organization: Not on file     Attends meetings of clubs or organizations: Not on file     Relationship status: Not on file   Other Topics Concern    Not on file   Social History Narrative    Not on file       Family History   Problem Relation Age of Onset    Asthma Son     Allergies Son     Allergic rhinitis Son     Asthma Grandchild     Allergies Grandchild     Allergic rhinitis Grandchild     Breast cancer Paternal Cousin     Angioedema Neg Hx     Eczema Neg Hx     Immunodeficiency Neg Hx        Review of Systems  Review of Systems   All other systems reviewed and are negative.       Objective:     Vitals:    10/22/20 0848   BP: 120/70   Weight: 56 kg (123 lb 7.3 oz)   Height: 5' 5" (1.651 m)       Physical Exam:   Constitutional: She appears well-developed and well-nourished. She is cooperative. No distress.                           Neurological: She is alert.          Assessment/ Plan:     Orders Placed This Encounter    CANCER ANTIGEN 125    Basic Metabolic Panel    Hemoglobin A1C       Isabelle was seen today for follow-up.    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 Will draw  and BMP today and call patient ASAP with results.  - patient requests her Hemoglobin A1C be checked today too.        "

## 2020-10-26 ENCOUNTER — OFFICE VISIT (OUTPATIENT)
Dept: GASTROENTEROLOGY | Facility: CLINIC | Age: 85
End: 2020-10-26
Payer: MEDICARE

## 2020-10-26 ENCOUNTER — TELEPHONE (OUTPATIENT)
Dept: ENDOSCOPY | Facility: HOSPITAL | Age: 85
End: 2020-10-26

## 2020-10-26 ENCOUNTER — TELEPHONE (OUTPATIENT)
Dept: OBSTETRICS AND GYNECOLOGY | Facility: CLINIC | Age: 85
End: 2020-10-26

## 2020-10-26 VITALS
HEIGHT: 65 IN | WEIGHT: 123.88 LBS | SYSTOLIC BLOOD PRESSURE: 153 MMHG | HEART RATE: 92 BPM | DIASTOLIC BLOOD PRESSURE: 85 MMHG | BODY MASS INDEX: 20.64 KG/M2

## 2020-10-26 DIAGNOSIS — R93.89 ABNORMAL FINDING ON IMAGING: Primary | ICD-10-CM

## 2020-10-26 DIAGNOSIS — K86.89 PANCREATIC MASS: Primary | ICD-10-CM

## 2020-10-26 PROCEDURE — 99202 PR OFFICE/OUTPT VISIT, NEW, LEVL II, 15-29 MIN: ICD-10-PCS | Mod: S$PBB,,, | Performed by: INTERNAL MEDICINE

## 2020-10-26 PROCEDURE — 99214 OFFICE O/P EST MOD 30 MIN: CPT | Mod: PBBFAC | Performed by: INTERNAL MEDICINE

## 2020-10-26 PROCEDURE — 99999 PR PBB SHADOW E&M-EST. PATIENT-LVL IV: CPT | Mod: PBBFAC,,, | Performed by: INTERNAL MEDICINE

## 2020-10-26 PROCEDURE — 99202 OFFICE O/P NEW SF 15 MIN: CPT | Mod: S$PBB,,, | Performed by: INTERNAL MEDICINE

## 2020-10-26 PROCEDURE — 99999 PR PBB SHADOW E&M-EST. PATIENT-LVL IV: ICD-10-PCS | Mod: PBBFAC,,, | Performed by: INTERNAL MEDICINE

## 2020-10-26 RX ORDER — TRAMADOL HYDROCHLORIDE 50 MG/1
50 TABLET ORAL EVERY 4 HOURS PRN
Qty: 180 TABLET | Refills: 1 | Status: SHIPPED | OUTPATIENT
Start: 2020-10-26 | End: 2020-12-25

## 2020-10-26 NOTE — LETTER
October 26, 2020      Favio Cintron MD  [address]  [facility] [address]           [facility] [address]  [address]  [facility] [address]  Phone: [phone]  Fax: [phone]          Patient: Isabelle Roberts   MR Number: [id number]   YOB: 1933   Date of Visit: 10/26/2020       Dear Dr. Favio Cintron:    Thank you for referring Isabelle Roberts to me for evaluation. Attached you will find relevant portions of my assessment and plan of care.    If you have questions, please do not hesitate to call me. I look forward to following Isabelle Roberts along with you.    Sincerely,    Yaw Montgomery MD    Enclosure  CC:  No Recipients    If you would like to receive this communication electronically, please contact [email or web address] or [phone] to request more information on [employer] access.    For providers and/or their staff who would like to refer a patient to [facility], please contact us through our one-stop-shop provider referral line, [facility], at [phone].    If you feel you have received this communication in error or would no longer like to receive these types of communications, please e-mail [email or web address]

## 2020-10-26 NOTE — PROGRESS NOTES
Advanced Endoscopy / Pancreaticobiliary Service    Reason for visit (Chief Complaint): pancreatic mass    Referring provider/PCP: Favio Cintron MD  [address] [place] [address]    History of Present Illness: Patient presents for evaluation of a pancreatic mass.  She was originally scheduled to see Dr. Cintron clinic.  However Dr. Cintron noted that the main reason for referral was for suspected cancer, and the recent CT scan was more suggestive of a pancreatic cancer rather than a gyn cancer source.    Her primary ob/gyn had started an evaluation for urinary symptoms and abd pain. Imaging revealed findings concerning for metastatic pancreatic cancer.  She is accompanied to this visit by her son with visiting her from [place].           Physical Exam:  General: Well-developed, well-appearing, no acute distress  Neuro: alert and oriented to person, place, time  Eyes: No scleral icterus        Laboratory:       Imaging:  Reviewed CT scan with patient and her son.    Assessment/Plan:  1- Pancreatic mass- imaging is suspicious for advanced disease with metastases to the liver and peritoneal cavity.  We discussed that step 1.  Is to obtain a tissue diagnosis.  She also complains of having ongoing abdominal pain.  At the same session as an endoscopic ultrasound-guided biopsy, an EUS guided celiac plexus block could be performed.  The patient is in agreement with this plan.  Once tissue confirmation has been obtained, referral to Medical Oncology will be made.    Total visit time was 25 minutes, more than 50% of which was spent in face-to-face counseling with patient regarding the evaluation, management, and treatment options for her pancreatic mass, and for coordinating care.    Yaw Montgomery MD, TIFFANIE   - Department of Gastroenterology  [facility] [place]

## 2020-10-27 ENCOUNTER — ANESTHESIA (OUTPATIENT)
Dept: ENDOSCOPY | Facility: HOSPITAL | Age: 85
End: 2020-10-27
Payer: MEDICARE

## 2020-10-27 ENCOUNTER — HOSPITAL ENCOUNTER (OUTPATIENT)
Facility: HOSPITAL | Age: 85
Discharge: HOME | End: 2020-10-27
Attending: INTERNAL MEDICINE | Admitting: INTERNAL MEDICINE
Payer: MEDICARE

## 2020-10-27 ENCOUNTER — ANESTHESIA EVENT (OUTPATIENT)
Dept: ENDOSCOPY | Facility: HOSPITAL | Age: 85
End: 2020-10-27
Payer: MEDICARE

## 2020-10-27 VITALS
TEMPERATURE: 99 F | OXYGEN SATURATION: 96 % | SYSTOLIC BLOOD PRESSURE: 125 MMHG | BODY MASS INDEX: 20.66 KG/M2 | HEIGHT: 65 IN | HEART RATE: 71 BPM | WEIGHT: 124 LBS | RESPIRATION RATE: 19 BRPM | DIASTOLIC BLOOD PRESSURE: 57 MMHG

## 2020-10-27 DIAGNOSIS — K86.89 PANCREATIC MASS: Primary | ICD-10-CM

## 2020-10-27 LAB — SARS-COV-2 RDRP RESP QL NAA+PROBE: NEGATIVE

## 2020-10-27 PROCEDURE — 37000009 HC ANESTHESIA EA ADD 15 MINS: Performed by: INTERNAL MEDICINE

## 2020-10-27 PROCEDURE — 88305 TISSUE EXAM BY PATHOLOGIST: CPT | Mod: 26,,, | Performed by: PATHOLOGY

## 2020-10-27 PROCEDURE — U0002 COVID-19 LAB TEST NON-CDC: HCPCS

## 2020-10-27 PROCEDURE — 88305 TISSUE EXAM BY PATHOLOGIST: CPT | Performed by: PATHOLOGY

## 2020-10-27 PROCEDURE — 88172 CYTP DX EVAL FNA 1ST EA SITE: CPT | Mod: 26,,, | Performed by: PATHOLOGY

## 2020-10-27 PROCEDURE — 88177 CYTP FNA EVAL EA ADDL: CPT | Performed by: PATHOLOGY

## 2020-10-27 PROCEDURE — 43242 EGD US FINE NEEDLE BX/ASPIR: CPT | Performed by: INTERNAL MEDICINE

## 2020-10-27 PROCEDURE — 25000003 PHARM REV CODE 250: Performed by: INTERNAL MEDICINE

## 2020-10-27 PROCEDURE — 88173 CYTOPATH EVAL FNA REPORT: CPT | Performed by: PATHOLOGY

## 2020-10-27 PROCEDURE — 88173 PR  INTERPRETATION OF FNA SMEAR: ICD-10-PCS | Mod: 26,,, | Performed by: PATHOLOGY

## 2020-10-27 PROCEDURE — 88177 CYTP FNA EVAL EA ADDL: CPT | Mod: 26,,, | Performed by: PATHOLOGY

## 2020-10-27 PROCEDURE — 88177 PR  EVALUATION OF FNA SMEAR TO DETERMINE ADEQUACY, EA ADD EVAL: ICD-10-PCS | Mod: 26,,, | Performed by: PATHOLOGY

## 2020-10-27 PROCEDURE — 88172 PR  EVALUATION OF FNA SMEAR TO DETERMINE ADEQUACY, FIRST EVAL: ICD-10-PCS | Mod: 26,,, | Performed by: PATHOLOGY

## 2020-10-27 PROCEDURE — 64680 INJECTION TREATMENT OF NERVE: CPT | Performed by: INTERNAL MEDICINE

## 2020-10-27 PROCEDURE — 88305 TISSUE EXAM BY PATHOLOGIST: ICD-10-PCS | Mod: 26,,, | Performed by: PATHOLOGY

## 2020-10-27 PROCEDURE — 43253 PR EGD, FLEX, W/US GUIDED INJ, DX/THER SUBST/FIDUCIAL MARKER: ICD-10-PCS | Mod: ,,, | Performed by: INTERNAL MEDICINE

## 2020-10-27 PROCEDURE — 27202131 HC NEEDLE, FNB SINGLE (ANY): Performed by: INTERNAL MEDICINE

## 2020-10-27 PROCEDURE — 25000003 PHARM REV CODE 250: Performed by: NURSE ANESTHETIST, CERTIFIED REGISTERED

## 2020-10-27 PROCEDURE — 43253 EGD US TRANSMURAL INJXN/MARK: CPT | Mod: ,,, | Performed by: INTERNAL MEDICINE

## 2020-10-27 PROCEDURE — 88172 CYTP DX EVAL FNA 1ST EA SITE: CPT | Performed by: PATHOLOGY

## 2020-10-27 PROCEDURE — 27202124 HC NEEDLE, CELIAC PLEXUS (ANY): Performed by: INTERNAL MEDICINE

## 2020-10-27 PROCEDURE — 63600175 PHARM REV CODE 636 W HCPCS: Performed by: NURSE ANESTHETIST, CERTIFIED REGISTERED

## 2020-10-27 PROCEDURE — 37000008 HC ANESTHESIA 1ST 15 MINUTES: Performed by: INTERNAL MEDICINE

## 2020-10-27 PROCEDURE — 63600175 PHARM REV CODE 636 W HCPCS: Performed by: INTERNAL MEDICINE

## 2020-10-27 PROCEDURE — 88173 CYTOPATH EVAL FNA REPORT: CPT | Mod: 26,,, | Performed by: PATHOLOGY

## 2020-10-27 RX ORDER — HYDROCODONE BITARTRATE AND ACETAMINOPHEN 5; 325 MG/1; MG/1
1 TABLET ORAL EVERY 8 HOURS PRN
Qty: 90 TABLET | Refills: 0 | Status: SHIPPED | OUTPATIENT
Start: 2020-10-27 | End: 2020-11-02

## 2020-10-27 RX ORDER — ONDANSETRON 2 MG/ML
INJECTION INTRAMUSCULAR; INTRAVENOUS
Status: DISCONTINUED | OUTPATIENT
Start: 2020-10-27 | End: 2020-10-27

## 2020-10-27 RX ORDER — BUPIVACAINE HYDROCHLORIDE 2.5 MG/ML
10 INJECTION, SOLUTION EPIDURAL; INFILTRATION; INTRACAUDAL ONCE
Status: COMPLETED | OUTPATIENT
Start: 2020-10-27 | End: 2020-10-27

## 2020-10-27 RX ORDER — PROPOFOL 10 MG/ML
VIAL (ML) INTRAVENOUS CONTINUOUS PRN
Status: DISCONTINUED | OUTPATIENT
Start: 2020-10-27 | End: 2020-10-27

## 2020-10-27 RX ORDER — PHENYLEPHRINE HYDROCHLORIDE 10 MG/ML
INJECTION INTRAVENOUS
Status: DISCONTINUED | OUTPATIENT
Start: 2020-10-27 | End: 2020-10-27

## 2020-10-27 RX ORDER — SODIUM CHLORIDE 9 MG/ML
INJECTION, SOLUTION INTRAVENOUS CONTINUOUS
Status: DISCONTINUED | OUTPATIENT
Start: 2020-10-27 | End: 2020-10-27 | Stop reason: HOSPADM

## 2020-10-27 RX ORDER — HYDROMORPHONE HYDROCHLORIDE 2 MG/ML
0.5 INJECTION, SOLUTION INTRAMUSCULAR; INTRAVENOUS; SUBCUTANEOUS ONCE
Status: COMPLETED | OUTPATIENT
Start: 2020-10-27 | End: 2020-10-27

## 2020-10-27 RX ORDER — SODIUM CHLORIDE 0.9 % (FLUSH) 0.9 %
10 SYRINGE (ML) INJECTION
Status: DISCONTINUED | OUTPATIENT
Start: 2020-10-27 | End: 2020-10-27 | Stop reason: HOSPADM

## 2020-10-27 RX ORDER — PROPOFOL 10 MG/ML
VIAL (ML) INTRAVENOUS
Status: DISCONTINUED | OUTPATIENT
Start: 2020-10-27 | End: 2020-10-27

## 2020-10-27 RX ORDER — LIDOCAINE HCL/PF 100 MG/5ML
SYRINGE (ML) INTRAVENOUS
Status: DISCONTINUED | OUTPATIENT
Start: 2020-10-27 | End: 2020-10-27

## 2020-10-27 RX ADMIN — BUPIVACAINE HYDROCHLORIDE 18 MG: 2.5 INJECTION, SOLUTION EPIDURAL; INFILTRATION; INTRACAUDAL at 12:10

## 2020-10-27 RX ADMIN — ONDANSETRON 4 MG: 2 INJECTION, SOLUTION INTRAMUSCULAR; INTRAVENOUS at 12:10

## 2020-10-27 RX ADMIN — LIDOCAINE HYDROCHLORIDE 50 MG: 20 INJECTION, SOLUTION INTRAVENOUS at 11:10

## 2020-10-27 RX ADMIN — PHENYLEPHRINE HYDROCHLORIDE 100 MCG: 10 INJECTION INTRAVENOUS at 12:10

## 2020-10-27 RX ADMIN — PROPOFOL 125 MCG/KG/MIN: 10 INJECTION, EMULSION INTRAVENOUS at 11:10

## 2020-10-27 RX ADMIN — PROPOFOL 40 MG: 10 INJECTION, EMULSION INTRAVENOUS at 11:10

## 2020-10-27 RX ADMIN — ALCOHOL 7.5 ML: 0.98 INJECTION INTRASPINAL at 12:10

## 2020-10-27 RX ADMIN — SODIUM CHLORIDE: 0.9 INJECTION, SOLUTION INTRAVENOUS at 09:10

## 2020-10-27 RX ADMIN — HYDROMORPHONE HYDROCHLORIDE 0.5 MG: 2 INJECTION, SOLUTION INTRAMUSCULAR; INTRAVENOUS; SUBCUTANEOUS at 11:10

## 2020-10-27 RX ADMIN — GLYCOPYRROLATE 0.2 MG: 0.2 INJECTION, SOLUTION INTRAMUSCULAR; INTRAVITREAL at 11:10

## 2020-10-27 NOTE — [employer]OVATION PATIENT INSTRUCTIONS
Discharge Summary/Instructions after an Endoscopic Procedure  Patient Name: Isabelle Roberts  Patient MRN: [id number]  Patient YOB: 1933  Tuesday, October 27, 2020  Yaw Montgomery MD  Your health is very important to us during the Covid Crisis. Following your   procedure today, you will receive a daily text for 2 weeks asking about   signs or symptoms of Covid 19.  Please respond to this text when you   receive it so we can follow up and keep you as safe as possible.   RESTRICTIONS:  During your procedure today, you received medications for sedation.  These   medications may affect your judgment, balance and coordination.  Therefore,   for 24 hours, you have the following restrictions:   - DO NOT drive a car, operate machinery, make legal/financial decisions,   sign important papers or drink alcohol.    ACTIVITY:  Today: no heavy lifting, straining or running due to procedural   sedation/anesthesia.  The following day: return to full activity including work.  DIET:  Eat and drink normally unless instructed otherwise.     TREATMENT FOR COMMON SIDE EFFECTS:  - Mild abdominal pain, nausea, belching, bloating or excessive gas:  rest,   eat lightly and use a heating pad.  - Sore Throat: treat with throat lozenges and/or gargle with warm salt   water.  - Because air was used during the procedure, expelling large amounts of air   from your rectum or belching is normal.  - If a bowel prep was taken, you may not have a bowel movement for 1-3 days.    This is normal.  SYMPTOMS TO WATCH FOR AND REPORT TO YOUR PHYSICIAN:  1. Abdominal pain or bloating, other than gas cramps.  2. Chest pain.  3. Back pain.  4. Signs of infection such as: chills or fever occurring within 24 hours   after the procedure.  5. Rectal bleeding, which would show as bright red, maroon, or black stools.   (A tablespoon of blood from the rectum is not serious, especially if   hemorrhoids are present.)  6. Vomiting.  7. Weakness or dizziness.  GO  DIRECTLY TO THE NEAREST EMERGENCY ROOM IF YOU HAVE ANY OF THE FOLLOWING:      Difficulty breathing              Chills and/or fever over 101 F   Persistent vomiting and/or vomiting blood   Severe abdominal pain   Severe chest pain   Black, tarry stools   Bleeding- more than one tablespoon   Any other symptom or condition that you feel may need urgent attention  Your doctor recommends these additional instructions:  If any biopsies were taken, your doctors clinic will contact you in 1 to 2   weeks with any results.  - Discharge patient to home (ambulatory).   - Patient has a contact number available for emergencies.  The signs and   symptoms of potential delayed complications were discussed with the   patient.  Return to normal activities tomorrow.  Written discharge   instructions were provided to the patient.   - Resume previous diet.   - Await cytology results. Will arrange for medical oncology visit pending   final pathology.  - Return to primary care physician as previously scheduled.  For questions, problems or results please call your physician - Yaw Montgomery MD.  EMERGENCY PHONE NUMBER: [phone],  LAB RESULTS: [phone]  IF A COMPLICATION OR EMERGENCY SITUATION ARISES AND YOU ARE UNABLE TO REACH   YOUR PHYSICIAN - GO DIRECTLY TO THE EMERGENCY ROOM.  Yaw Montgomery MD  10/27/2020 1:05:12 PM  This report has been verified and signed electronically.  [employer]ATION

## 2020-10-27 NOTE — TRANSFER OF CARE
"Anesthesia Transfer of Care Note    Patient: Isabelle Roberts    Procedure(s) Performed: Procedure(s) (LRB):  ULTRASOUND, UPPER GI TRACT, ENDOSCOPIC (N/A)    Patient location: GI    Anesthesia Type: MAC    Transport from OR: Transported from OR on room air with adequate spontaneous ventilation    Post pain: adequate analgesia    Post assessment: no apparent anesthetic complications    Post vital signs: stable    Level of consciousness: awake    Nausea/Vomiting: no nausea/vomiting    Complications: none    Transfer of care protocol was followed      Last vitals:   Visit Vitals  BP (!) 152/68 (BP Location: Left arm, Patient Position: Lying)   Pulse 72   Temp 37 °C (98.6 °F) (Skin)   Resp 16   Ht 5' 5" (1.651 m)   Wt 56.2 kg (124 lb)   SpO2 95%   Breastfeeding No   BMI 20.63 kg/m²     "

## 2020-10-27 NOTE — ANESTHESIA PREPROCEDURE EVALUATION
10/27/2020  Isabelle Roberts is a 86 y.o., female.    Anesthesia Evaluation     I have reviewed the Nursing Notes.    I have reviewed the Medications.     Review of Systems  Anesthesia Hx:  No problems with previous Anesthesia Denies Hx of Anesthetic complications Denies Family Hx of Anesthesia complications.    Social:  Non-Smoker, No Alcohol Use    Hematology/Oncology:  Hematology Normal   Oncology Normal     EENT/Dental:EENT/Dental Normal   Cardiovascular:   Exercise tolerance: good Hypertension    Pulmonary:  Pulmonary Normal    Renal/:  Renal/ Normal     Hepatic/GI:  Hepatic/GI Normal    Musculoskeletal:  Musculoskeletal Normal    Neurological:  Neurology Normal    Endocrine:   Hypothyroidism        Physical Exam  General:  Well nourished    Airway/Jaw/Neck:  Airway Findings: Mouth Opening: Normal Tongue: Normal  General Airway Assessment: Adult  Mallampati: II  TM Distance: Normal, at least 6 cm  Jaw/Neck Findings:     Neck ROM: Normal ROM      Dental:  Dental Findings: In tact        Mental Status:  Mental Status Findings:  Cooperative, Alert and Oriented         Anesthesia Plan  Type of Anesthesia, risks & benefits discussed:  Anesthesia Type:  MAC  Patient's Preference: MAC  Intra-op Monitoring Plan:   Intra-op Monitoring Plan Comments:   Post Op Pain Control Plan:   Post Op Pain Control Plan Comments:   Induction:   IV  Beta Blocker:         Informed Consent: Patient understands risks and agrees with Anesthesia plan.  Questions answered. Anesthesia consent signed with patient.  ASA Score: 2     Day of Surgery Review of History & Physical:            Ready For Surgery From Anesthesia Perspective.

## 2020-10-27 NOTE — ANESTHESIA POSTPROCEDURE EVALUATION
Anesthesia Post Evaluation    Patient: Isabelle Roberts    Procedure(s) Performed: Procedure(s) (LRB):  ULTRASOUND, UPPER GI TRACT, ENDOSCOPIC (N/A)    Final Anesthesia Type: MAC    Patient location during evaluation: GI PACU  Patient participation: Yes- Able to Participate  Level of consciousness: awake and alert  Post-procedure vital signs: reviewed and stable  Pain management: adequate  Airway patency: patent    PONV status at discharge: No PONV  Anesthetic complications: no      Cardiovascular status: blood pressure returned to baseline and hemodynamically stable  Respiratory status: unassisted, spontaneous ventilation and room air  Hydration status: euvolemic  Follow-up not needed.          Vitals Value Taken Time   /55 10/27/20 1243   Temp 98 10/27/20 1243   Pulse 71 10/27/20 1243   Resp 14 10/27/20 1243   SpO2 96 10/27/20 1243         No case tracking events are documented in the log.      Pain/Neal Score: Pain Rating Prior to Med Admin: 6 (10/27/2020 11:26 AM)

## 2020-10-27 NOTE — H&P
Short Stay Endoscopy History and Physical    PCP - Devin Bernal MD  Referring Physician - Devin Bernal MD  [address]  [address]  [place] [id number]    Procedure - EUS  ASA - per anesthesia  Mallampati - per anesthesia  History of Anesthesia problems - no  Family history Anesthesia problems -  no   Plan of anesthesia - General    HPI:  This is a 86 y.o. female here for evaluation of: pancreatic mass    Reflux - no  Dysphagia - no  Abdominal pain - POS  Diarrhea - no    ROS:  Constitutional: No fevers, chills, POS weight loss  CV: No chest pain  Pulm: No cough, No shortness of breath  GI: see HPI    Medical History:  has a past medical history of Allergy, Anxiety, Arthritis, Breast cancer, Cancer, Glaucoma, Glucose intolerance (impaired glucose tolerance), History of blood transfusion, Hyperlipidemia, Hypertension, Hypothyroidism, Insomnia, Raynaud's disease, Recurrent cystitis, and Thyroid disease.    Surgical History:  has a past surgical history that includes Appendectomy; Hysterectomy; Breast lumpectomy; and Tubal ligation.    Family History: family history includes Allergic rhinitis in her grandchild and son; Allergies in her grandchild and son; Asthma in her grandchild and son; Breast cancer in her paternal cousin..    Social History:  reports that she quit smoking about 59 years ago. Her smoking use included cigarettes. She has a 5.00 pack-year smoking history. She has never used smokeless tobacco. She reports current alcohol use of about 2.0 standard drinks of alcohol per week. She reports that she does not use drugs.    Review of patient's allergies indicates:   Allergen Reactions    Sunscreen Other (See Comments)     redness    Sulfa (sulfonamide antibiotics) Rash       Medications:   No medications prior to admission.       Physical Exam:    Vital Signs:   Vitals:    10/27/20 1310   BP: (!) 125/57   Pulse: 71   Resp: 19   Temp:        General Appearance: Well appearing in no acute  distress  Eyes:    No scleral icterus  Lungs: no labored breathing  Heart:  Regular  Abdomen: non tender    Labs:  Lab Results   Component Value Date    WBC 6.77 08/05/2020    HGB 13.0 08/05/2020    HCT 39.9 08/05/2020     08/05/2020    CHOL 209 (H) 08/05/2020    TRIG 91 08/05/2020    HDL 69 08/05/2020    ALT 22 08/05/2020    AST 21 08/05/2020     10/22/2020    K 4.1 10/22/2020     10/22/2020    CREATININE 0.8 10/22/2020    BUN 11 10/22/2020    CO2 28 10/22/2020    TSH 0.767 08/05/2020    HGBA1C 6.0 (H) 10/22/2020       I have explained the risks and benefits of this endoscopic procedure to the patient including but not limited to bleeding, inflammation, infection, perforation, and death.      Yaw Montgomery MD

## 2020-10-28 ENCOUNTER — TELEPHONE (OUTPATIENT)
Dept: FAMILY MEDICINE | Facility: CLINIC | Age: 85
End: 2020-10-28

## 2020-10-28 NOTE — TELEPHONE ENCOUNTER
----- Message from Kiarra Agudelo sent at 10/28/2020 10:27 AM CDT -----  Contact: Brandon Roberts, pt son, [phone]  Called in requesting to know what all patient's emergency contacts have to do in order to be able to talk to all of her doctors. Advised they and the patient would have to go in the office and sign an involvement of care form. Requests to speak with nurse, states pt was diagnosed with cancer and wants to know if you can send them the form and they can fax it back.

## 2020-10-29 ENCOUNTER — TELEPHONE (OUTPATIENT)
Dept: FAMILY MEDICINE | Facility: CLINIC | Age: 85
End: 2020-10-29

## 2020-10-29 NOTE — TELEPHONE ENCOUNTER
----- Message from Dk London sent at 10/29/2020 10:27 AM CDT -----  Regarding: Return call  Type:  Patient Returning Call    Who Called: Brandon  Who Left Message for Patient:  Selin  Does the patient know what this is regarding?: yes  Would the patient rather a call back or a response via [employer]?  Call back  Best Call Back Number: [phone]  Additional Information: no

## 2020-10-29 NOTE — TELEPHONE ENCOUNTER
Informed pt's son, Brandon that for the office to be able to discuss his mother's medical information an Involvement in Care paperwork would have to be signed. Pt will bring his mother to the clinic for her to fill out the Involvement in Care.

## 2020-10-30 ENCOUNTER — TELEPHONE (OUTPATIENT)
Dept: GASTROENTEROLOGY | Facility: CLINIC | Age: 85
End: 2020-10-30

## 2020-10-30 ENCOUNTER — TELEPHONE (OUTPATIENT)
Dept: HEMATOLOGY/ONCOLOGY | Facility: CLINIC | Age: 85
End: 2020-10-30

## 2020-10-30 LAB
ADEQUACY: ABNORMAL
FINAL PATHOLOGIC DIAGNOSIS: ABNORMAL

## 2020-10-30 NOTE — TELEPHONE ENCOUNTER
Please schedule appointment next week with Dr. Hernandez per Dr. Montgomery.  Please see pathology report from 10/27/2020

## 2020-10-30 NOTE — TELEPHONE ENCOUNTER
Pt.'s son confirmed appt. With Dr. Hernandez (per referral) at 1:40pm on 11/2/20 (per pt. Preference).

## 2020-10-30 NOTE — TELEPHONE ENCOUNTER
----- Message from Megan Abbott sent at 10/30/2020 12:57 PM CDT -----  Contact: [phone]  Patient would like to speak with you about getting test results. Please advise.

## 2020-10-30 NOTE — TELEPHONE ENCOUNTER
3:17pm:  Unable to leave message for pt. To confirm office visit with Dr. Arenas at 1:40pm on 11/3/20.    3:19pm:  Pt.'s son and brother confirmed appt, with Dr. Arenas at 1:40pm on 11/3/20.

## 2020-11-02 ENCOUNTER — TELEPHONE (OUTPATIENT)
Dept: HEMATOLOGY/ONCOLOGY | Facility: CLINIC | Age: 85
End: 2020-11-02

## 2020-11-02 ENCOUNTER — TELEPHONE (OUTPATIENT)
Dept: FAMILY MEDICINE | Facility: CLINIC | Age: 85
End: 2020-11-02

## 2020-11-02 ENCOUNTER — OFFICE VISIT (OUTPATIENT)
Dept: HEMATOLOGY/ONCOLOGY | Facility: CLINIC | Age: 85
End: 2020-11-02
Payer: MEDICARE

## 2020-11-02 VITALS
TEMPERATURE: 98 F | RESPIRATION RATE: 18 BRPM | DIASTOLIC BLOOD PRESSURE: 78 MMHG | BODY MASS INDEX: 20.46 KG/M2 | SYSTOLIC BLOOD PRESSURE: 150 MMHG | WEIGHT: 122.81 LBS | HEART RATE: 91 BPM | HEIGHT: 65 IN | OXYGEN SATURATION: 95 %

## 2020-11-02 DIAGNOSIS — C25.9 PANCREATIC CANCER METASTASIZED TO LIVER: Primary | ICD-10-CM

## 2020-11-02 DIAGNOSIS — C78.7 PANCREATIC CANCER METASTASIZED TO LIVER: Primary | ICD-10-CM

## 2020-11-02 DIAGNOSIS — G89.3 NEOPLASM RELATED PAIN (ACUTE) (CHRONIC): ICD-10-CM

## 2020-11-02 DIAGNOSIS — K59.00 CONSTIPATION, UNSPECIFIED CONSTIPATION TYPE: ICD-10-CM

## 2020-11-02 DIAGNOSIS — F51.04 CHRONIC INSOMNIA: ICD-10-CM

## 2020-11-02 PROCEDURE — 99215 OFFICE O/P EST HI 40 MIN: CPT | Mod: S$PBB,,, | Performed by: INTERNAL MEDICINE

## 2020-11-02 PROCEDURE — 99215 PR OFFICE/OUTPT VISIT, EST, LEVL V, 40-54 MIN: ICD-10-PCS | Mod: S$PBB,,, | Performed by: INTERNAL MEDICINE

## 2020-11-02 PROCEDURE — 99999 PR PBB SHADOW E&M-EST. PATIENT-LVL V: CPT | Mod: PBBFAC,,, | Performed by: INTERNAL MEDICINE

## 2020-11-02 PROCEDURE — 99999 PR PBB SHADOW E&M-EST. PATIENT-LVL V: ICD-10-PCS | Mod: PBBFAC,,, | Performed by: INTERNAL MEDICINE

## 2020-11-02 PROCEDURE — 99215 OFFICE O/P EST HI 40 MIN: CPT | Mod: PBBFAC,PO | Performed by: INTERNAL MEDICINE

## 2020-11-02 RX ORDER — FENTANYL 25 UG/1
1 PATCH TRANSDERMAL
Qty: 10 PATCH | Refills: 0 | Status: SHIPPED | OUTPATIENT
Start: 2020-11-02 | End: 2020-11-02

## 2020-11-02 RX ORDER — OXYCODONE HYDROCHLORIDE 5 MG/1
5 TABLET ORAL EVERY 4 HOURS PRN
Qty: 90 TABLET | Refills: 0 | Status: SHIPPED | OUTPATIENT
Start: 2020-11-02 | End: 2021-11-02

## 2020-11-02 RX ORDER — LACTULOSE 10 G/15ML
10 SOLUTION ORAL 4 TIMES DAILY PRN
Qty: 500 ML | Refills: 3 | Status: SHIPPED | OUTPATIENT
Start: 2020-11-02

## 2020-11-02 RX ORDER — ONDANSETRON 4 MG/1
4 TABLET, ORALLY DISINTEGRATING ORAL EVERY 6 HOURS PRN
Qty: 60 TABLET | Refills: 1 | Status: SHIPPED | OUTPATIENT
Start: 2020-11-02

## 2020-11-02 RX ORDER — ZOLPIDEM TARTRATE 10 MG/1
10 TABLET ORAL NIGHTLY
Qty: 90 TABLET | Refills: 0 | Status: SHIPPED | OUTPATIENT
Start: 2020-11-02

## 2020-11-02 RX ORDER — FENTANYL 25 UG/1
1 PATCH TRANSDERMAL
Qty: 10 PATCH | Refills: 0 | Status: SHIPPED | OUTPATIENT
Start: 2020-11-02

## 2020-11-02 NOTE — TELEPHONE ENCOUNTER
duplicate----- Message from Sharmila Giraldo sent at 11/2/2020  2:49 PM CST -----  Contact: Edwige [phone]  [facility] is calling to speak with you about patient's prescription that was sent in. Please call

## 2020-11-02 NOTE — PROGRESS NOTES
PATIENT: Isabelle Roberts  MRN: [id number]  DATE: 11/2/2020    Diagnosis:   1. Pancreatic cancer metastasized to liver    2. Neoplasm related pain (acute) (chronic)    3. Chronic insomnia    4. Constipation, unspecified constipation type      Chief Complaint: Hepatic Cancer    Subjective:     History of Present Illness:     86-year-old female well known to me.  She has history of stage IA left breast cancer for which she underwent partial mastectomy and radiation therapy in 2008.  She then took hormonal therapy for 4 years and did well.    Most recently she underwent a pelvic ultrasound for recurrent UTIs and abdominal bloating/discomfort, and was noted to have free fluid in the pelvis.  This led to a CT scan of the abdomen (10/23/2020) that showed multiple poorly defined enhancing masses throughout the left and right hepatic lobe, compatible with metastatic disease.  Index lesion in the right lobe measured 3 cm.  In the pancreas there was a 4 x 3 cm irregular mixed solid and cystic mass along with satellite nodules extending through the lesser sac into the central small bowel mesentry with discrete peritoneal nodules measuring up to 15 mm.    10/27/2020-EUS showed mass in the pancreatic body.  It measured 35 x 28 mm.  FNA was done.  Celiac plexus neurolysis were also performed.    Final pathology showed adenocarcinoma.    She is accompanied to the clinic by her son and daughter-in-law (they live in [place]) and grandson.    She is awake, alert and ambulatory.    She is taking 4 to 5 Norcos a day.  Has constipation.  No appetite.  Lost nearly 10 lbs in the last 3 months.  Has nausea.    Past Medical History:   Past Medical History:   Diagnosis Date    Allergy     Anxiety     Arthritis     Breast cancer     Cancer     Glaucoma     Glucose intolerance (impaired glucose tolerance)     History of blood transfusion     Hyperlipidemia     Hypertension     Hypothyroidism     Insomnia     Raynaud's disease      Recurrent cystitis     Thyroid disease        Past Surgical History:   Past Surgical History:   Procedure Laterality Date    APPENDECTOMY      BREAST LUMPECTOMY      ENDOSCOPIC ULTRASOUND OF UPPER GASTROINTESTINAL TRACT N/A 10/27/2020    Procedure: ULTRASOUND, UPPER GI TRACT, ENDOSCOPIC;  Surgeon: Yaw Montgomery MD;  [facility]: [facility];  Service: Endoscopy;  Laterality: N/A;    HYSTERECTOMY      TUBAL LIGATION         Family History:   Family History   Problem Relation Age of Onset    Asthma Son     Allergies Son     Allergic rhinitis Son     Asthma Grandchild     Allergies Grandchild     Allergic rhinitis Grandchild     Breast cancer Paternal Cousin     Angioedema Neg Hx     Eczema Neg Hx     Immunodeficiency Neg Hx        Social History:  reports that she quit smoking about 59 years ago. Her smoking use included cigarettes. She has a 5.00 pack-year smoking history. She has never used smokeless tobacco. She reports current alcohol use of about 2.0 standard drinks of alcohol per week. She reports that she does not use drugs.    Allergies:  Review of patient's allergies indicates:   Allergen Reactions    Sunscreen Other (See Comments)     redness    Sulfa (sulfonamide antibiotics) Rash       Medications:  Current Outpatient Medications   Medication Sig Dispense Refill    amLODIPine (NORVASC) 10 MG tablet Take 1 tablet (10 mg total) by mouth once daily. 90 tablet 3    benazepriL (LOTENSIN) 40 MG tablet Take 1 tablet (40 mg total) by mouth once daily. 90 tablet 3    clotrimazole-betamethasone 1-0.05% (LOTRISONE) cream Apply topically 2 (two) times daily. 15 g 1    dorzolamide-timolol 2-0.5% (COSOPT) 22.3-6.8 mg/mL ophthalmic solution Place 1 drop into both eyes 2 (two) times daily.       doxycycline (MONODOX) 100 MG capsule Take 1 capsule (100 mg total) by mouth 2 (two) times daily. 20 capsule 0    fentaNYL (DURAGESIC) 25 mcg/hr Place 1 patch onto the skin every 72 hours. 10 patch 0     hydrocortisone-pramoxine (ANALPRAM-HC) 2.5-1 % Crea Place rectally 3 (three) times daily. 30 g 0    lactulose (CHRONULAC) 20 gram/30 mL Soln Take 15 mLs (10 g total) by mouth 4 (four) times daily as needed (as needed for constipation). 500 mL 3    LORazepam (ATIVAN) 1 MG tablet Take 1 tablet (1 mg total) by mouth every evening. 90 tablet 3    LUMIGAN 0.01 % Drop Place 1 drop into the right eye every evening.       mirtazapine (REMERON) 15 MG tablet TAKE 1 TABLET (15 MG TOTAL) BY MOUTH AT BEDTIME 90 tablet 3    ondansetron (ZOFRAN-ODT) 4 MG TbDL Take 1 tablet (4 mg total) by mouth every 6 (six) hours as needed (nausea). 60 tablet 1    oxyCODONE (ROXICODONE) 5 MG immediate release tablet Take 1 tablet (5 mg total) by mouth every 4 (four) hours as needed for Pain. 90 tablet 0    SYNTHROID 75 mcg tablet Take 1 tablet (75 mcg total) by mouth before breakfast. 90 tablet 3    traMADoL (ULTRAM) 50 mg tablet Take 1 tablet (50 mg total) by mouth every 4 (four) hours as needed for Pain. 180 tablet 1    ZIOPTAN, PF, 0.0015 % Dpet INSTILL 1 DROP INTO EACH EYE ONCE DAILY  6    zolpidem (AMBIEN) 10 mg Tab Take 1 tablet (10 mg total) by mouth every evening. 90 tablet 0     No current facility-administered medications for this visit.        Review of Systems   Constitutional: Positive for activity change, appetite change and unexpected weight change. Negative for fever.   HENT: Negative for mouth sores, nosebleeds and sore throat.    Eyes: Negative for pain and redness.   Respiratory: Negative for cough, shortness of breath and wheezing.    Cardiovascular: Negative for chest pain and palpitations.   Gastrointestinal: Positive for abdominal pain and constipation. Negative for nausea.   Genitourinary: Negative for dysuria and hematuria.   Musculoskeletal: Negative for arthralgias and back pain.   Skin: Negative for rash and wound.   Allergic/Immunologic: Negative for food allergies.   Neurological: Positive for weakness.  "Negative for tremors and headaches.   Hematological: Negative for adenopathy. Does not bruise/bleed easily.   Psychiatric/Behavioral: Negative for behavioral problems, confusion and sleep disturbance. The patient is not nervous/anxious.        Objective:     Vitals:    11/02/20 1358   BP: (!) 150/78   Pulse: 91   Resp: 18   Temp: 98 °F (36.7 °C)   SpO2: 95%   Weight: 55.7 kg (122 lb 12.7 oz)   Height: 5' 5" (1.651 m)     BMI: Body mass index is 20.43 kg/m².    Physical Exam  Vitals signs reviewed.   Constitutional:       General: She is not in acute distress.     Appearance: She is not ill-appearing or diaphoretic.   HENT:      Head: No right periorbital erythema, left periorbital erythema or laceration.      Mouth/Throat:      Pharynx: No oropharyngeal exudate.      Tonsils: No tonsillar exudate.   Eyes:      Conjunctiva/sclera: Conjunctivae normal.   Neck:      Musculoskeletal: Neck supple.      Thyroid: No thyroid mass or thyromegaly.   Cardiovascular:      Rate and Rhythm: Normal rate and regular rhythm.      Heart sounds: No friction rub.   Pulmonary:      Effort: No tachypnea, accessory muscle usage or respiratory distress.      Breath sounds: Normal breath sounds. No stridor.   Chest:      Chest wall: No tenderness. There is no dullness to percussion.   Abdominal:      General: There is no distension.      Palpations: Abdomen is soft. There is no mass.   Lymphadenopathy:      Head:      Right side of head: No submental or submandibular adenopathy.      Left side of head: No submental or submandibular adenopathy.      Cervical: No cervical adenopathy.   Skin:     Findings: No bruising, petechiae or rash.   Neurological:      Mental Status: She is alert.      Cranial Nerves: No cranial nerve deficit.   Psychiatric:         Mood and Affect: Mood is not depressed.         Behavior: Behavior normal. Behavior is cooperative.         Thought Content: Thought content normal.         Assessment:       1. Pancreatic " cancer metastasized to liver    2. Neoplasm related pain (acute) (chronic)    3. Chronic insomnia    4. Constipation, unspecified constipation type      Plan:   Metastatic pancreatic cancer  -new diagnosis  -she is symptomatic. Main symptoms are abdominal pain, lack of appetite, nausea and weight loss  -she is not interested in any treatments for the pancreatic cancer  -she is interested in symptom control  -currently she is taking 4 to 5 Norcos a day  -discussed options for pain management, she is interested in the fentanyl patch  -will start fentanyl 25 mcg patch  -will discontinue Norco  -start oxycodone 5 mg tablets to be used as needed  -discussed bowel regimen, gave prescription for lactulose  -also prescribed Zofran for nausea  -discussed about hospice care, provided information, she and her family would like to think about it  -all questions answered

## 2020-11-02 NOTE — TELEPHONE ENCOUNTER
----- Message from Megan Abbott sent at 11/2/2020  9:04 AM CST -----  Contact: SELF [phone]  Patient would like a refill for zolpidem (AMBIEN) 10 mg Tab sent to [facility] [address], [place] [address] and needs it by end of this morning she is completely out and wants the message sent on high alert Please advise.

## 2020-11-02 NOTE — TELEPHONE ENCOUNTER
Pharmacy stated do not have patches. rx [facility] per pt request    ----- Message from Marianne Urias sent at 11/2/2020  2:57 PM CST -----  Type:  Pharmacy Calling to Clarify an RX    Name of Caller: Mehrdad  Pharmacy Name: [facility]  Prescription Name: oxyCODONE (ROXICODONE) 5 MG immediate release tablet  What do they need to clarify?: pt just picked up [place]/30 day supply  Best Call Back Number:   Additional Information: n/a

## 2020-11-04 ENCOUNTER — TELEPHONE (OUTPATIENT)
Dept: HEMATOLOGY/ONCOLOGY | Facility: CLINIC | Age: 85
End: 2020-11-04

## 2020-11-04 NOTE — TELEPHONE ENCOUNTER
Per dr jones, pt can put 2 patches on (50mg) and take 1 oxy per hour as needed    ----- Message from Kiarra Agudelo sent at 11/4/2020  4:12 PM CST -----  Contact: Cirilo, pt son, [phone]  States pt is in a lot of pain and wants to know if she can take 2 Oxycodone at the same time. Please advise.

## 2020-11-04 NOTE — TELEPHONE ENCOUNTER
Pt requested hospice. Will consult with Dr Hernandez for the order----- Message from Zeny Banegas sent at 11/4/2020  8:20 AM CST -----  Type:  Needs Medical Advice    Who Called: Isabelle Latham  Symptoms (please be specific): pt is requesting to speak with Dr. Hernandez regarding setting up [facility] and she said she also has some other questions for him   How long has patient had these symptoms:  unknown  Pharmacy name and phone #:  n/a  Would the patient rather a call back or a response via [facility]? Call back  Best Call Back Number: [phone]  Additional Information: none

## 2020-11-09 ENCOUNTER — NURSE TRIAGE (OUTPATIENT)
Dept: ADMINISTRATIVE | Facility: CLINIC | Age: 85
End: 2020-11-09

## 2021-10-01 ENCOUNTER — PES CALL (OUTPATIENT)
Dept: ADMINISTRATIVE | Facility: CLINIC | Age: 86
End: 2021-10-01

## 2022-01-28 NOTE — TELEPHONE ENCOUNTER
"I had sent ciprofloxacin antibiotic over during Saturday clinic.  Her urine culture actually showed no growth-- this was my results message below:    "Please notify of no bacterial growth on urine culture.  She can still complete the ciprofloxacin antibiotic but if her symptoms persist then she should follow up in the office to discuss what else could be causing her symptoms. Thanks"  "
----- Message from Esha Katz MA sent at 1/16/2017  9:43 AM CST -----  Contact: [phone] / self       ----- Message -----     From: Vani Sanchez     Sent: 1/14/2017   9:52 AM       To: [facility] Staff    Requesting medication for UTI.    Would like medication to be sent to [facility] [phone].    Please advise.  
Called patient to inform of the results.  
no concerns

## 2022-01-31 ENCOUNTER — PES CALL (OUTPATIENT)
Dept: ADMINISTRATIVE | Facility: CLINIC | Age: 87
End: 2022-01-31
Payer: MEDICARE

## 2022-06-27 ENCOUNTER — PES CALL (OUTPATIENT)
Dept: ADMINISTRATIVE | Facility: CLINIC | Age: 87
End: 2022-06-27
Payer: MEDICARE

## 2022-11-28 ENCOUNTER — TELEPHONE (OUTPATIENT)
Dept: FAMILY MEDICINE | Facility: CLINIC | Age: 87
End: 2022-11-28
Payer: MEDICARE